# Patient Record
Sex: FEMALE | Race: OTHER | HISPANIC OR LATINO | Employment: FULL TIME | URBAN - METROPOLITAN AREA
[De-identification: names, ages, dates, MRNs, and addresses within clinical notes are randomized per-mention and may not be internally consistent; named-entity substitution may affect disease eponyms.]

---

## 2018-03-07 ENCOUNTER — HOSPITAL ENCOUNTER (OUTPATIENT)
Dept: LAB | Facility: MEDICAL CENTER | Age: 28
End: 2018-03-07
Attending: SPECIALIST
Payer: COMMERCIAL

## 2018-03-07 LAB — B-HCG SERPL-ACNC: <0.6 MIU/ML (ref 0–5)

## 2018-03-07 PROCEDURE — 84702 CHORIONIC GONADOTROPIN TEST: CPT

## 2018-03-07 PROCEDURE — 36415 COLL VENOUS BLD VENIPUNCTURE: CPT

## 2018-11-16 ENCOUNTER — HOSPITAL ENCOUNTER (OUTPATIENT)
Dept: LAB | Facility: MEDICAL CENTER | Age: 28
End: 2018-11-16
Attending: OBSTETRICS & GYNECOLOGY
Payer: COMMERCIAL

## 2018-11-16 LAB
ALBUMIN SERPL BCP-MCNC: 4.9 G/DL (ref 3.2–4.9)
ALBUMIN/GLOB SERPL: 2 G/DL
ALP SERPL-CCNC: 69 U/L (ref 30–99)
ALT SERPL-CCNC: 57 U/L (ref 2–50)
ANION GAP SERPL CALC-SCNC: 10 MMOL/L (ref 0–11.9)
AST SERPL-CCNC: 27 U/L (ref 12–45)
BILIRUB SERPL-MCNC: 0.7 MG/DL (ref 0.1–1.5)
BUN SERPL-MCNC: 19 MG/DL (ref 8–22)
CALCIUM SERPL-MCNC: 9.9 MG/DL (ref 8.5–10.5)
CHLORIDE SERPL-SCNC: 104 MMOL/L (ref 96–112)
CO2 SERPL-SCNC: 25 MMOL/L (ref 20–33)
CREAT SERPL-MCNC: 0.64 MG/DL (ref 0.5–1.4)
GLOBULIN SER CALC-MCNC: 2.4 G/DL (ref 1.9–3.5)
GLUCOSE SERPL-MCNC: 88 MG/DL (ref 65–99)
POTASSIUM SERPL-SCNC: 3.8 MMOL/L (ref 3.6–5.5)
PROT SERPL-MCNC: 7.3 G/DL (ref 6–8.2)
SODIUM SERPL-SCNC: 139 MMOL/L (ref 135–145)
T4 FREE SERPL-MCNC: 0.96 NG/DL (ref 0.53–1.43)
TSH SERPL DL<=0.005 MIU/L-ACNC: 1.85 UIU/ML (ref 0.38–5.33)

## 2018-11-16 PROCEDURE — 84439 ASSAY OF FREE THYROXINE: CPT

## 2018-11-16 PROCEDURE — 36415 COLL VENOUS BLD VENIPUNCTURE: CPT

## 2018-11-16 PROCEDURE — 84443 ASSAY THYROID STIM HORMONE: CPT

## 2018-11-16 PROCEDURE — 80053 COMPREHEN METABOLIC PANEL: CPT

## 2019-05-20 ENCOUNTER — APPOINTMENT (OUTPATIENT)
Dept: RADIOLOGY | Facility: MEDICAL CENTER | Age: 29
End: 2019-05-20
Attending: OBSTETRICS & GYNECOLOGY
Payer: COMMERCIAL

## 2019-06-10 ENCOUNTER — HOSPITAL ENCOUNTER (OUTPATIENT)
Dept: RADIOLOGY | Facility: MEDICAL CENTER | Age: 29
End: 2019-06-10
Attending: OBSTETRICS & GYNECOLOGY
Payer: COMMERCIAL

## 2019-06-10 DIAGNOSIS — Z34.82 PRENATAL CARE, SUBSEQUENT PREGNANCY, SECOND TRIMESTER: ICD-10-CM

## 2019-06-10 PROCEDURE — 76805 OB US >/= 14 WKS SNGL FETUS: CPT

## 2019-07-11 ENCOUNTER — HOSPITAL ENCOUNTER (OUTPATIENT)
Dept: LAB | Facility: MEDICAL CENTER | Age: 29
End: 2019-07-11
Attending: OBSTETRICS & GYNECOLOGY
Payer: COMMERCIAL

## 2019-07-11 LAB
ABO GROUP BLD: NORMAL
ERYTHROCYTE [DISTWIDTH] IN BLOOD BY AUTOMATED COUNT: 40.5 FL (ref 35.9–50)
GLUCOSE 1H P 50 G GLC PO SERPL-MCNC: 142 MG/DL (ref 70–139)
HCT VFR BLD AUTO: 37.7 % (ref 37–47)
HGB BLD-MCNC: 13 G/DL (ref 12–16)
MCH RBC QN AUTO: 29.8 PG (ref 27–33)
MCHC RBC AUTO-ENTMCNC: 34.5 G/DL (ref 33.6–35)
MCV RBC AUTO: 86.5 FL (ref 81.4–97.8)
PLATELET # BLD AUTO: 284 K/UL (ref 164–446)
PMV BLD AUTO: 9.5 FL (ref 9–12.9)
RBC # BLD AUTO: 4.36 M/UL (ref 4.2–5.4)
RH BLD: NORMAL
TREPONEMA PALLIDUM IGG+IGM AB [PRESENCE] IN SERUM OR PLASMA BY IMMUNOASSAY: NON REACTIVE
WBC # BLD AUTO: 6.4 K/UL (ref 4.8–10.8)

## 2019-07-11 PROCEDURE — 36415 COLL VENOUS BLD VENIPUNCTURE: CPT

## 2019-07-11 PROCEDURE — 86780 TREPONEMA PALLIDUM: CPT

## 2019-07-11 PROCEDURE — 86901 BLOOD TYPING SEROLOGIC RH(D): CPT

## 2019-07-11 PROCEDURE — 86900 BLOOD TYPING SEROLOGIC ABO: CPT

## 2019-07-11 PROCEDURE — 85027 COMPLETE CBC AUTOMATED: CPT

## 2019-07-11 PROCEDURE — 82950 GLUCOSE TEST: CPT

## 2021-05-27 ENCOUNTER — TELEPHONE (OUTPATIENT)
Dept: SCHEDULING | Facility: IMAGING CENTER | Age: 31
End: 2021-05-27

## 2021-06-08 ENCOUNTER — HOSPITAL ENCOUNTER (OUTPATIENT)
Dept: LAB | Facility: MEDICAL CENTER | Age: 31
End: 2021-06-08
Attending: NURSE PRACTITIONER
Payer: COMMERCIAL

## 2021-06-08 ENCOUNTER — OFFICE VISIT (OUTPATIENT)
Dept: MEDICAL GROUP | Facility: PHYSICIAN GROUP | Age: 31
End: 2021-06-08
Payer: COMMERCIAL

## 2021-06-08 VITALS
RESPIRATION RATE: 14 BRPM | WEIGHT: 142 LBS | DIASTOLIC BLOOD PRESSURE: 78 MMHG | HEART RATE: 83 BPM | SYSTOLIC BLOOD PRESSURE: 108 MMHG | TEMPERATURE: 99.5 F | OXYGEN SATURATION: 100 % | HEIGHT: 62 IN | BODY MASS INDEX: 26.13 KG/M2

## 2021-06-08 DIAGNOSIS — Z01.818 PREOP GENERAL PHYSICAL EXAM: ICD-10-CM

## 2021-06-08 DIAGNOSIS — Z01.818 PREOP GENERAL PHYSICAL EXAM: Primary | ICD-10-CM

## 2021-06-08 LAB
ALBUMIN SERPL BCP-MCNC: 4.9 G/DL (ref 3.2–4.9)
ALBUMIN/GLOB SERPL: 1.8 G/DL
ALP SERPL-CCNC: 66 U/L (ref 30–99)
ALT SERPL-CCNC: 42 U/L (ref 2–50)
ANION GAP SERPL CALC-SCNC: 7 MMOL/L (ref 7–16)
APPEARANCE UR: NORMAL
AST SERPL-CCNC: 29 U/L (ref 12–45)
BILIRUB SERPL-MCNC: 0.5 MG/DL (ref 0.1–1.5)
BILIRUB UR STRIP-MCNC: NORMAL MG/DL
BUN SERPL-MCNC: 18 MG/DL (ref 8–22)
CALCIUM SERPL-MCNC: 9.7 MG/DL (ref 8.5–10.5)
CHLORIDE SERPL-SCNC: 108 MMOL/L (ref 96–112)
CO2 SERPL-SCNC: 23 MMOL/L (ref 20–33)
COLOR UR AUTO: YELLOW
CREAT SERPL-MCNC: 0.6 MG/DL (ref 0.5–1.4)
ERYTHROCYTE [DISTWIDTH] IN BLOOD BY AUTOMATED COUNT: 41.9 FL (ref 35.9–50)
EST. AVERAGE GLUCOSE BLD GHB EST-MCNC: 94 MG/DL
FASTING STATUS PATIENT QL REPORTED: NORMAL
GLOBULIN SER CALC-MCNC: 2.7 G/DL (ref 1.9–3.5)
GLUCOSE SERPL-MCNC: 96 MG/DL (ref 65–99)
GLUCOSE UR STRIP.AUTO-MCNC: NORMAL MG/DL
HBA1C MFR BLD: 4.9 % (ref 4–5.6)
HCG SERPL QL: NEGATIVE
HCT VFR BLD AUTO: 46.1 % (ref 37–47)
HGB BLD-MCNC: 15.6 G/DL (ref 12–16)
KETONES UR STRIP.AUTO-MCNC: NORMAL MG/DL
LEUKOCYTE ESTERASE UR QL STRIP.AUTO: NORMAL
MCH RBC QN AUTO: 29.8 PG (ref 27–33)
MCHC RBC AUTO-ENTMCNC: 33.8 G/DL (ref 33.6–35)
MCV RBC AUTO: 88.1 FL (ref 81.4–97.8)
NITRITE UR QL STRIP.AUTO: NORMAL
PH UR STRIP.AUTO: 5 [PH] (ref 5–8)
PLATELET # BLD AUTO: 271 K/UL (ref 164–446)
PMV BLD AUTO: 10.6 FL (ref 9–12.9)
POTASSIUM SERPL-SCNC: 4.8 MMOL/L (ref 3.6–5.5)
PROT SERPL-MCNC: 7.6 G/DL (ref 6–8.2)
PROT UR QL STRIP: NORMAL MG/DL
RBC # BLD AUTO: 5.23 M/UL (ref 4.2–5.4)
RBC UR QL AUTO: NORMAL
SODIUM SERPL-SCNC: 138 MMOL/L (ref 135–145)
SP GR UR STRIP.AUTO: 5
UROBILINOGEN UR STRIP-MCNC: NORMAL MG/DL
WBC # BLD AUTO: 6.3 K/UL (ref 4.8–10.8)

## 2021-06-08 PROCEDURE — 85027 COMPLETE CBC AUTOMATED: CPT

## 2021-06-08 PROCEDURE — 83036 HEMOGLOBIN GLYCOSYLATED A1C: CPT

## 2021-06-08 PROCEDURE — 81002 URINALYSIS NONAUTO W/O SCOPE: CPT | Performed by: NURSE PRACTITIONER

## 2021-06-08 PROCEDURE — 36415 COLL VENOUS BLD VENIPUNCTURE: CPT

## 2021-06-08 PROCEDURE — 84703 CHORIONIC GONADOTROPIN ASSAY: CPT

## 2021-06-08 PROCEDURE — 80053 COMPREHEN METABOLIC PANEL: CPT

## 2021-06-08 PROCEDURE — 99204 OFFICE O/P NEW MOD 45 MIN: CPT | Performed by: NURSE PRACTITIONER

## 2021-06-08 ASSESSMENT — PATIENT HEALTH QUESTIONNAIRE - PHQ9: CLINICAL INTERPRETATION OF PHQ2 SCORE: 0

## 2021-06-08 NOTE — PROGRESS NOTES
"Subjective:     CC: Preoperative clearance.    HPI:   Zoya presents today to establish care.  Her past medical, family, social and surgical history were reviewed today.  She is pending surgery with Dr. Nuñez and needs preoperative clearance today.      History reviewed. No pertinent past medical history.    Social History     Tobacco Use   • Smoking status: Never Smoker   • Smokeless tobacco: Never Used   Vaping Use   • Vaping Use: Never used   Substance Use Topics   • Alcohol use: Yes     Comment: occ   • Drug use: Never       No current [facility]-ordered outpatient medications on file.     No current [facility]-ordered facility-administered medications on file.       Allergies:  Patient has no allergy information on record.    Health Maintenance: Deferred.    ROS:  Gen: no fevers/chills, no changes in weight  Eyes: no changes in vision  ENT: no sore throat, no hearing loss, no bloody nose  Pulm: no sob, no cough  CV: no chest pain, no palpitations  GI: no nausea/vomiting, no diarrhea  : no dysuria  MSk: no myalgias  Skin: no rash  Neuro: no headaches, no numbness/tingling  Heme/Lymph: no easy bruising      Objective:       Exam:  /78   Pulse 83   Temp 37.5 °C (99.5 °F)   Resp 14   Ht 1.575 m (5' 2\")   Wt 64.4 kg (142 lb)   LMP 05/22/2021 (Approximate)   SpO2 100%   BMI 25.97 kg/m²  Body mass index is 25.97 kg/m².    Gen: Alert and oriented, No apparent distress.  Neck: Neck is supple without lymphadenopathy.  No carotid bruits.  Lungs: Normal effort, CTA bilaterally, no wheezes, rhonchi, or rales  CV: Regular rate and rhythm. No murmurs, rubs, or gallops.  Ext: No clubbing, cyanosis, edema.    Labs: None.     EKG Interpretation   Ordered and interpreted by SHAUN Manzanares  Rhythm: Normal sinus   Rate: 62 and normal   VT:  .12  QRS: .08  QT:  .40  ST Segments:No acute change   T Waves: No acute change   Q Waves: none   Clinical Impression: no acute changes and normal EKG        Assessment & Plan:     31 " y.o. female with the following -       1. Preop general physical exam  Patient is planning on having plastic surgery with Dr. Luis Enrique Nuñez.  She does need preop clearance for this procedure and she is here today for that.  Patient brings list of labs including HCG, CBC, CMP, hemoglobin A1c that her surgeon is requesting, as well as an EKG which was performed in the office today.   When the results of her labs are available, we will fax to Dr. Nuñez's office.    Patient has no history of peripheral artery disease, TIA or stroke, heart failure, hypertension, coronary artery disease, or valvular disease.  Patient does not have a history of atrial fibrillation or conduction abnormalities. Patient does not have a history of diabetes or kidney disease.      Patient denies chest pain, shortness of breath, syncope or palpitations.  Functional capacity status exam reveals patient achieves a total of greater than 10 METS, indicating a good prognostic indicator.  The [employer]geons surgical risk calculator indicates patient has a low risk of serious complications.  Patient’s EKG today indicates normal sinus rhythm.  At this time, patient is cleared for surgery.    - EKG - Clinic Performed  - Comp Metabolic Panel; Future  - CBC WITHOUT DIFFERENTIAL; Future  - HEMOGLOBIN A1C; Future    ADDENDUM:  All labs requested by surgeon are completely normal.  This note and the labs will be faxed to his office today, June 9, 2021.    Return in about 1 year (around 6/8/2022).    I have placed the below orders and discussed them with an approved delegating provider.  The MA is performing the below orders under the direction of Chayito Elena MD.    Please note that this dictation was created using voice recognition software. I have made every reasonable attempt to correct obvious errors, but I expect that there are errors of grammar and possibly content that I did not discover before finalizing the note.

## 2021-06-08 NOTE — ASSESSMENT & PLAN NOTE
Patient is planning on having plastic surgery with Dr. Luis Enrique Nuñez.  She does need preop clearance for this procedure and she is here today for that.  Patient brings list of labs including HCG, CBC, CMP, hemoglobin A1c that her surgeon is requesting, as well as an EKG which was performed in the office today.   When the results of her labs are available, we will fax to Dr. Nuñez's office.

## 2021-07-22 ENCOUNTER — TELEPHONE (OUTPATIENT)
Dept: MEDICAL GROUP | Facility: PHYSICIAN GROUP | Age: 31
End: 2021-07-22

## 2021-07-22 DIAGNOSIS — Z01.818 PREOPERATIVE CLEARANCE: ICD-10-CM

## 2021-07-23 NOTE — TELEPHONE ENCOUNTER
VOICEMAIL  1. Caller Name: Zoya                        Call Back Number: [phone] (home) [phone] (work)      2. Message: Pt called stating that she missed her flight for her surgery and due to covid regulations will need to have new lab orders completed for the surgeon before her new future surgery date.    3. Patient approves office to leave a detailed voicemail/MyChart message: N\A

## 2021-07-30 ENCOUNTER — HOSPITAL ENCOUNTER (OUTPATIENT)
Dept: LAB | Facility: MEDICAL CENTER | Age: 31
End: 2021-07-30
Attending: NURSE PRACTITIONER
Payer: COMMERCIAL

## 2021-07-30 DIAGNOSIS — Z01.818 PREOPERATIVE CLEARANCE: ICD-10-CM

## 2021-07-30 LAB
ALBUMIN SERPL BCP-MCNC: 4.6 G/DL (ref 3.2–4.9)
ALBUMIN/GLOB SERPL: 1.8 G/DL
ALP SERPL-CCNC: 62 U/L (ref 30–99)
ALT SERPL-CCNC: 27 U/L (ref 2–50)
ANION GAP SERPL CALC-SCNC: 10 MMOL/L (ref 7–16)
APPEARANCE UR: ABNORMAL
AST SERPL-CCNC: 19 U/L (ref 12–45)
BACTERIA #/AREA URNS HPF: ABNORMAL /HPF
BILIRUB SERPL-MCNC: 0.7 MG/DL (ref 0.1–1.5)
BILIRUB UR QL STRIP.AUTO: NEGATIVE
BUN SERPL-MCNC: 14 MG/DL (ref 8–22)
CALCIUM SERPL-MCNC: 9.3 MG/DL (ref 8.5–10.5)
CHLORIDE SERPL-SCNC: 103 MMOL/L (ref 96–112)
CO2 SERPL-SCNC: 24 MMOL/L (ref 20–33)
COLOR UR: YELLOW
CREAT SERPL-MCNC: 0.57 MG/DL (ref 0.5–1.4)
EPI CELLS #/AREA URNS HPF: ABNORMAL /HPF
ERYTHROCYTE [DISTWIDTH] IN BLOOD BY AUTOMATED COUNT: 40.4 FL (ref 35.9–50)
EST. AVERAGE GLUCOSE BLD GHB EST-MCNC: 97 MG/DL
FASTING STATUS PATIENT QL REPORTED: NORMAL
GLOBULIN SER CALC-MCNC: 2.5 G/DL (ref 1.9–3.5)
GLUCOSE SERPL-MCNC: 93 MG/DL (ref 65–99)
GLUCOSE UR STRIP.AUTO-MCNC: NEGATIVE MG/DL
HBA1C MFR BLD: 5 % (ref 4–5.6)
HCG SERPL QL: NEGATIVE
HCT VFR BLD AUTO: 44.4 % (ref 37–47)
HGB BLD-MCNC: 15.3 G/DL (ref 12–16)
HYALINE CASTS #/AREA URNS LPF: ABNORMAL /LPF
KETONES UR STRIP.AUTO-MCNC: NEGATIVE MG/DL
LEUKOCYTE ESTERASE UR QL STRIP.AUTO: ABNORMAL
MCH RBC QN AUTO: 30.1 PG (ref 27–33)
MCHC RBC AUTO-ENTMCNC: 34.5 G/DL (ref 33.6–35)
MCV RBC AUTO: 87.4 FL (ref 81.4–97.8)
MICRO URNS: ABNORMAL
NITRITE UR QL STRIP.AUTO: POSITIVE
PH UR STRIP.AUTO: 6 [PH] (ref 5–8)
PLATELET # BLD AUTO: 298 K/UL (ref 164–446)
PMV BLD AUTO: 10.7 FL (ref 9–12.9)
POTASSIUM SERPL-SCNC: 4.6 MMOL/L (ref 3.6–5.5)
PROT SERPL-MCNC: 7.1 G/DL (ref 6–8.2)
PROT UR QL STRIP: NEGATIVE MG/DL
RBC # BLD AUTO: 5.08 M/UL (ref 4.2–5.4)
RBC # URNS HPF: ABNORMAL /HPF
RBC UR QL AUTO: ABNORMAL
SODIUM SERPL-SCNC: 137 MMOL/L (ref 135–145)
SP GR UR STRIP.AUTO: 1.02
UROBILINOGEN UR STRIP.AUTO-MCNC: 0.2 MG/DL
WBC # BLD AUTO: 6.1 K/UL (ref 4.8–10.8)
WBC #/AREA URNS HPF: ABNORMAL /HPF

## 2021-07-30 PROCEDURE — 84703 CHORIONIC GONADOTROPIN ASSAY: CPT

## 2021-07-30 PROCEDURE — 36415 COLL VENOUS BLD VENIPUNCTURE: CPT

## 2021-07-30 PROCEDURE — 81001 URINALYSIS AUTO W/SCOPE: CPT

## 2021-07-30 PROCEDURE — 83036 HEMOGLOBIN GLYCOSYLATED A1C: CPT

## 2021-07-30 PROCEDURE — 85027 COMPLETE CBC AUTOMATED: CPT

## 2021-07-30 PROCEDURE — 80053 COMPREHEN METABOLIC PANEL: CPT

## 2021-08-02 DIAGNOSIS — N30.00 ACUTE CYSTITIS WITHOUT HEMATURIA: ICD-10-CM

## 2021-08-02 RX ORDER — NITROFURANTOIN 25; 75 MG/1; MG/1
100 CAPSULE ORAL 2 TIMES DAILY
Qty: 10 CAPSULE | Refills: 0 | Status: SHIPPED | OUTPATIENT
Start: 2021-08-02 | End: 2024-01-19

## 2022-02-23 ENCOUNTER — TELEPHONE (OUTPATIENT)
Dept: MEDICAL GROUP | Facility: PHYSICIAN GROUP | Age: 32
End: 2022-02-23
Payer: COMMERCIAL

## 2022-02-24 NOTE — TELEPHONE ENCOUNTER
VOICEMAIL  1. Caller Name: Zoya                        Call Back Number: [phone] (home) [phone] (work)      2. Message: Pt called and asked for a referral for wound care, pt states she believes she has necrosis on an incision she has on her abdomen.  Pt has not been seen since June 2021.  Calling pt to make an appt to see PCP.    3. Patient approves office to leave a detailed voicemail/MyChart message: N\A

## 2022-02-25 NOTE — TELEPHONE ENCOUNTER
2nd Attempt:     LIDIA    Contacted pt and pt stated that she went to [facility] and they told her that the substance is dried blood and is not Necrosis.     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

## 2022-03-28 ENCOUNTER — OFFICE VISIT (OUTPATIENT)
Dept: WOUND CARE | Facility: MEDICAL CENTER | Age: 32
End: 2022-03-28
Attending: FAMILY MEDICINE
Payer: COMMERCIAL

## 2022-03-28 VITALS
HEART RATE: 76 BPM | TEMPERATURE: 99.1 F | OXYGEN SATURATION: 96 % | RESPIRATION RATE: 16 BRPM | DIASTOLIC BLOOD PRESSURE: 82 MMHG | SYSTOLIC BLOOD PRESSURE: 139 MMHG

## 2022-03-28 DIAGNOSIS — S31.109A OPEN WOUND OF ABDOMEN, INITIAL ENCOUNTER: ICD-10-CM

## 2022-03-28 PROCEDURE — 99214 OFFICE O/P EST MOD 30 MIN: CPT

## 2022-03-28 PROCEDURE — 11042 DBRDMT SUBQ TIS 1ST 20SQCM/<: CPT

## 2022-03-28 PROCEDURE — 11042 DBRDMT SUBQ TIS 1ST 20SQCM/<: CPT | Performed by: STUDENT IN AN ORGANIZED HEALTH CARE EDUCATION/TRAINING PROGRAM

## 2022-03-28 ASSESSMENT — ENCOUNTER SYMPTOMS
DIARRHEA: 0
ORTHOPNEA: 0
CHILLS: 0
VOMITING: 0
NAUSEA: 0
FEVER: 0
CONSTIPATION: 0

## 2022-03-28 ASSESSMENT — PAIN SCALES - GENERAL: PAINLEVEL: NO PAIN

## 2022-03-28 NOTE — PATIENT INSTRUCTIONS
-Keep your wound dressing clean, dry, and intact.    -Change your dressing if it becomes soiled, soaked, or falls off.    --Should you experience any significant changes in your wound(s), such as infection (redness, swelling, localized heat, increased pain, fever > 101 F, chills) or have any questions regarding your home care instructions, please contact the wound center at [phone]. If after hours, contact your primary care physician or go to the hospital emergency room.

## 2022-03-28 NOTE — PROGRESS NOTES
Provider Encounter- Full Thickness wound    HISTORY OF PRESENT ILLNESS  Wound History:    START OF CARE IN CLINIC: 3/28/2022    REFERRING PROVIDER: Le Case      WOUND- Full Thickness Wound   LOCATION: Lower Anterior Abdomen, Left lower Abdomen / Flank   HISTORY:  31F underwent abdominoplasty, liposuction, and Brazilian butt lift on 1/25/2022 in [place]. Patient surgical recovery was complicated by developing wound dehiscence and eschar to abdominal wound. Patient was following with [facility] but transferred care to [facility] as she moved to [place]. Patient was previously treated with snap VAC and was treated with medihoney and telfa prior to today's appointment.     Pertinent Medical History: s/p abdominoplasty, liposuction, brazilian butt lift. Remote history of caesarian section    TOBACCO USE:  Denies every using    Patient's problem list, allergies, and current medications reviewed and updated in Epic    Interval History:  3/28/2022: Initial Clinic visit with Ayush Hernandez MD. Patient was previously being seen at [facility] for open wound lower anterior abdomen following abdominoplasty. Patient reports being in her normal state of health, denies any symptoms of infection. She reports wound drains, but unsure of color as she has been using medihoney on wound. Patient denies any previous medical history and takes no medications regularly.      REVIEW OF SYSTEMS:   Review of Systems   Constitutional: Negative for chills, fever and malaise/fatigue.   Cardiovascular: Negative for orthopnea and leg swelling.   Gastrointestinal: Negative for constipation, diarrhea, nausea and vomiting.   Skin:        Open wound lower abdomen       PHYSICAL EXAMINATION:   /82   Pulse 76   Temp 37.3 °C (99.1 °F)   Resp 16   LMP 02/22/2022   SpO2 96%   Breastfeeding Unknown     Physical Exam  Constitutional:       General: She is not in acute distress.  Cardiovascular:      Rate and Rhythm: Normal  rate.   Pulmonary:      Effort: Pulmonary effort is normal. No respiratory distress.   Abdominal:      General: There is no distension.      Palpations: Abdomen is soft.      Tenderness: There is no abdominal tenderness.   Musculoskeletal:      Right lower leg: No edema.      Left lower leg: No edema.   Skin:     Comments: Full thickness wound lower anterior abdomen to fat layer  Indurated scar tissue surrounding wound  See flowsheet for details   Neurological:      General: No focal deficit present.      Mental Status: She is alert and oriented to person, place, and time. Mental status is at baseline.   Psychiatric:         Mood and Affect: Mood normal.         Behavior: Behavior normal.         WOUND ASSESSMENT  Wound 03/28/22 Pelvis Anterior Low Abdomen midline (Active)   Wound Image    03/28/22 1600   Site Assessment Yellow;Pink;Pale 03/28/22 1600   Periwound Assessment Pink;Induration 03/28/22 1600   Margins Epibole (rolled edges) 03/28/22 1600   Drainage Amount Small 03/28/22 1600   Drainage Description Serous;Yellow 03/28/22 1600   Treatments Cleansed;Topical Lidocaine;Provider debridement 03/28/22 1600   Wound Cleansing Puracyn [place] 03/28/22 1600   Periwound Protectant Skin Protectant Wipes to Periwound;Barrier Paste 03/28/22 1600   Dressing Cleansing/Solutions Not Applicable 03/28/22 1600   Dressing Options Hydrofiber Silver;Silicone Adhesive Foam 03/28/22 1600   Dressing Changed New 03/28/22 1600   Dressing Status Clean;Dry;Intact 03/28/22 1600   Dressing Change/Treatment Frequency Every 72 hrs, and As Needed 03/28/22 1600   Non-staged Wound Description Full thickness 03/28/22 1600   Wound Length (cm) 2 cm 03/28/22 1600   Wound Width (cm) 2.4 cm 03/28/22 1600   Wound Depth (cm) 1 cm 03/28/22 1600   Wound Surface Area (cm^2) 4.8 cm^2 03/28/22 1600   Wound Volume (cm^3) 4.8 cm^3 03/28/22 1600   Post-Procedure Length (cm) 2.2 cm 03/28/22 1600   Post-Procedure Width (cm) 2.4 cm 03/28/22 1600   Post-Procedure  Depth (cm) 1 cm 03/28/22 1600   Post-Procedure Surface Area (cm^2) 5.28 cm^2 03/28/22 1600   Post-Procedure Volume (cm^3) 5.28 cm^3 03/28/22 1600   Wound Bed Slough (%) 100 % 03/28/22 1600   Tunneling (cm) 0 cm 03/28/22 1600   Undermining (cm) 0.3 cm 03/28/22 1600   Undermining of Wound, 1st Location From 7 o'clock;To 1 o'clock 03/28/22 1600   Wound Odor None 03/28/22 1600   Exposed Structures Adipose 03/28/22 1600   Number of days: 0       PROCEDURE:   -2% viscous lidocaine applied topically to wound bed for approximately 5 minutes prior to debridement  -Forceps, scissors, curette used to debride wound bed.  Excisional debridement was performed to remove devitalized tissue until healthy, bleeding tissue was visualized.   Entire surface of wound, 5.28cm2 debrided.  Tissue debrided into the subcutaneous layer.    -Bleeding controlled with manual pressure.    -Wound care completed by wound RN, refer to flowsheet  -Patient tolerated the procedure well, without c/o pain or discomfort.       Pertinent Labs and Diagnostics:    Labs:     A1c:   Lab Results   Component Value Date/Time    HBA1C 5.0 07/30/2021 09:33 AM          IMAGING: NA    VASCULAR STUDIES: NA    LAST  WOUND CULTURE:  DATE : No results found for: CULTRSULT      ASSESSMENT AND PLAN:     1. Open wound of abdomen, initial encounter  Comments: Open necrotic wound following abdominoplasty in [place] 1/2022 previously following [facility] clinic. Now establishing at [facility].    3/28/2022: Wound covered with slough, following debridement down to adipose layer. Noted to have circumferential undermining and epibole  - Excisional debridement was performed in clinic, medically necessary to promote wound healing.  - Discussed nature of wound and plans for wound healing  - Noted to have induration, likely scar tissue in periwound. No evidence of infection  - Due to depth of wound, will apply for prior authorization for snap VAC. Hope to place next clinic  appointment.  - Return to clinic weekly for assessment and debridement as needed   Wound Care: Hydrofiber silver, Adhesive foam    PATIENT EDUCATION  - Importance of adequate nutrition for wound healing  -Advised to go to ER for any increased redness, swelling, drainage, or odor, or if patient develops fever, chills, nausea or vomiting.     My total time spent caring for the patient on the day of the encounter was 30 minutes.   This does not include time spent on separately billable procedures/tests.       Please note that this note may have been created using voice recognition software. I have worked with technical experts from [facility] to optimize the interface.  I have made every reasonable attempt to correct obvious errors, but there may be errors of grammar and possibly content that I did not discover before finalizing the note.    N

## 2022-04-05 ENCOUNTER — OFFICE VISIT (OUTPATIENT)
Dept: WOUND CARE | Facility: MEDICAL CENTER | Age: 32
End: 2022-04-05
Attending: FAMILY MEDICINE
Payer: COMMERCIAL

## 2022-04-05 VITALS
HEART RATE: 72 BPM | OXYGEN SATURATION: 100 % | RESPIRATION RATE: 17 BRPM | SYSTOLIC BLOOD PRESSURE: 135 MMHG | TEMPERATURE: 98 F | DIASTOLIC BLOOD PRESSURE: 88 MMHG

## 2022-04-05 DIAGNOSIS — S31.109D OPEN WOUND OF ABDOMEN, SUBSEQUENT ENCOUNTER: ICD-10-CM

## 2022-04-05 PROCEDURE — 99213 OFFICE O/P EST LOW 20 MIN: CPT

## 2022-04-05 PROCEDURE — 11042 DBRDMT SUBQ TIS 1ST 20SQCM/<: CPT

## 2022-04-05 PROCEDURE — 11042 DBRDMT SUBQ TIS 1ST 20SQCM/<: CPT | Performed by: NURSE PRACTITIONER

## 2022-04-05 ASSESSMENT — ENCOUNTER SYMPTOMS
CHILLS: 0
DIARRHEA: 0
FEVER: 0
CONSTIPATION: 0
ORTHOPNEA: 0
NAUSEA: 0
VOMITING: 0

## 2022-04-05 NOTE — PROGRESS NOTES
Provider Encounter- Full Thickness wound    HISTORY OF PRESENT ILLNESS  Wound History:    START OF CARE IN CLINIC: 3/28/2022    REFERRING PROVIDER: Le Case      WOUND- Full Thickness Wound   LOCATION: Lower Anterior Abdomen, Left lower Abdomen / Flank   HISTORY:  31F underwent abdominoplasty, liposuction, and Brazilian butt lift on 1/25/2022 in [place]. Patient surgical recovery was complicated by developing wound dehiscence and eschar to abdominal wound. Patient was following with [facility] but transferred care to [facility] as she moved to [place]. Patient was previously treated with snap VAC and was treated with medihoney and telfa prior to today's appointment.     Pertinent Medical History: s/p abdominoplasty, liposuction, brazilian butt lift. Remote history of caesarian section    TOBACCO USE:  Denies every using    Patient's problem list, allergies, and current medications reviewed and updated in Epic    Interval History:  3/28/2022: Initial Clinic visit with Ayush Hernandez MD. Patient was previously being seen at [facility] for open wound lower anterior abdomen following abdominoplasty. Patient reports being in her normal state of health, denies any symptoms of infection. She reports wound drains, but unsure of color as she has been using medihoney on wound. Patient denies any previous medical history and takes no medications regularly.    4/5/2022 : Clinic visit with SHAUN Richey, FNP-BC, CWOCN, CFCN.  Zoya states she is feeling well overall, denies fevers, chills, nausea, vomiting, cough or shortness of breath.  She has had to change her dressing 3 times over the past week, feels this is because the zinc paste around her wound is keeping the dressing from adhering.  SNAP is still pending.      REVIEW OF SYSTEMS:   Review of Systems   Constitutional: Negative for chills, fever and malaise/fatigue.   Cardiovascular: Negative for orthopnea and leg swelling.   Gastrointestinal:  Negative for constipation, diarrhea, nausea and vomiting.   Skin:        Open wound lower abdomen       PHYSICAL EXAMINATION:   /88 (BP Location: Left arm, Patient Position: Sitting)   Pulse 72   Temp 36.7 °C (98 °F) (Temporal)   Resp 17   SpO2 100%     Physical Exam  Constitutional:       General: She is not in acute distress.  Cardiovascular:      Rate and Rhythm: Normal rate.   Pulmonary:      Effort: Pulmonary effort is normal. No respiratory distress.   Abdominal:      General: There is no distension.      Palpations: Abdomen is soft.      Tenderness: There is no abdominal tenderness.   Musculoskeletal:      Right lower leg: No edema.      Left lower leg: No edema.   Skin:     Comments: Full thickness wound lower anterior abdomen to fat layer  Indurated scar tissue surrounding wound  See flowsheet for details   Neurological:      General: No focal deficit present.      Mental Status: She is alert and oriented to person, place, and time. Mental status is at baseline.   Psychiatric:         Mood and Affect: Mood normal.         Behavior: Behavior normal.         WOUND ASSESSMENT  Wound 03/28/22 Pelvis Anterior Low Abdomen midline (Active)   Wound Image    04/05/22 0800   Site Assessment Yellow;Pink;Pale 04/05/22 0800   Periwound Assessment Pink;Induration 04/05/22 0800   Margins Epibole (rolled edges) 04/05/22 0800   Drainage Amount Moderate 04/05/22 0800   Drainage Description Serosanguineous 04/05/22 0800   Treatments Cleansed;Topical Lidocaine;Provider debridement;Site care 04/05/22 0800   Wound Cleansing Puracyn [place] 04/05/22 0800   Periwound Protectant Skin Protectant Wipes to Periwound;Barrier Paste 04/05/22 0800   Dressing Cleansing/Solutions Not Applicable 04/05/22 0800   Dressing Options Hydrofiber Silver Strip;Hydrofiber Silver;Silicone Adhesive Foam 04/05/22 0800   Dressing Changed Changed 04/05/22 0800   Dressing Status Clean;Dry;Intact 04/05/22 0800   Dressing Change/Treatment Frequency  Every 72 hrs, and As Needed 03/28/22 1600   Non-staged Wound Description Full thickness 04/05/22 0800   Wound Length (cm) 2 cm 04/05/22 0800   Wound Width (cm) 1.9 cm 04/05/22 0800   Wound Depth (cm) 1 cm 04/05/22 0800   Wound Surface Area (cm^2) 3.8 cm^2 04/05/22 0800   Wound Volume (cm^3) 3.8 cm^3 04/05/22 0800   Post-Procedure Length (cm) 2 cm 04/05/22 0800   Post-Procedure Width (cm) 1.8 cm 04/05/22 0800   Post-Procedure Depth (cm) 1.1 cm 04/05/22 0800   Post-Procedure Surface Area (cm^2) 3.6 cm^2 04/05/22 0800   Post-Procedure Volume (cm^3) 3.96 cm^3 04/05/22 0800   Wound Healing % 21 04/05/22 0800   Wound Bed Slough (%) 100 % 03/28/22 1600   Tunneling (cm) 0 cm 04/05/22 0800   Undermining (cm) 1.5 cm 04/05/22 0800   Undermining of Wound, 1st Location From 7 o'clock;To 3 o'clock 04/05/22 0800   Wound Odor None 04/05/22 0800   Exposed Structures Adipose 04/05/22 0800   Number of days: 8       PROCEDURE:   -2% viscous lidocaine applied topically to wound bed for approximately 5 minutes prior to debridement  -Forceps, scissors, curette used to debride wound bed.  Excisional debridement was performed to remove devitalized tissue until healthy, bleeding tissue was visualized.   Entire surface of wound, 3.6 cm2 debrided.  Tissue debrided into the subcutaneous layer.    -Bleeding controlled with manual pressure.    -Wound care completed by wound RN, refer to flowsheet  -Patient tolerated the procedure well, without c/o pain or discomfort.       Pertinent Labs and Diagnostics:    Labs:     A1c:   Lab Results   Component Value Date/Time    HBA1C 5.0 07/30/2021 09:33 AM          IMAGING: NA    VASCULAR STUDIES: NA    LAST  WOUND CULTURE:  DATE : No results found for: CULTRSULT      ASSESSMENT AND PLAN:     1. Open wound of abdomen, initial encounter  Comments: Open necrotic wound following abdominoplasty in [place] 1/2022 previously following [facility] clinic. Now establishing at [facility].    4/5/2022: Wound bed  covered with slough and adipose, edges closed.  Area has decreased since last assessment.  - Excisional debridement of wound bed and edges was performed in clinic, medically necessary to promote wound healing.  -No signs or symptoms of infection today  -Authorization for SNAP pending  - Return to clinic weekly for assessment and debridement as needed  -Patient to change dressing 1-2 times per week in between clinic visits     Wound Care: Hydrofiber silver, Adhesive foam    PATIENT EDUCATION  - Importance of adequate nutrition for wound healing  -Advised to go to ER for any increased redness, swelling, drainage, or odor, or if patient develops fever, chills, nausea or vomiting.     My total time spent caring for the patient on the day of the encounter was 20 minutes.   This does not include time spent on separately billable procedures/tests.       Please note that this note may have been created using voice recognition software. I have worked with technical experts from [facility] to optimize the interface.  I have made every reasonable attempt to correct obvious errors, but there may be errors of grammar and possibly content that I did not discover before finalizing the note.    N

## 2022-04-05 NOTE — PATIENT INSTRUCTIONS
-Keep your wound dressing clean, dry, and intact.    -Change your dressing if it becomes soiled, soaked, or falls off.    -Should you experience any significant changes in your wound(s), such as infection (redness, swelling, localized heat, increased pain, fever > 101 F, chills) or have any questions regarding your home care instructions, please contact the wound center at [phone]. If after hours, contact your primary care physician or go to the hospital emergency room.

## 2022-04-12 ENCOUNTER — OFFICE VISIT (OUTPATIENT)
Dept: WOUND CARE | Facility: MEDICAL CENTER | Age: 32
End: 2022-04-12
Attending: FAMILY MEDICINE
Payer: COMMERCIAL

## 2022-04-12 VITALS
OXYGEN SATURATION: 99 % | DIASTOLIC BLOOD PRESSURE: 89 MMHG | TEMPERATURE: 98.4 F | SYSTOLIC BLOOD PRESSURE: 139 MMHG | RESPIRATION RATE: 16 BRPM | HEART RATE: 65 BPM

## 2022-04-12 DIAGNOSIS — S31.109D OPEN WOUND OF ABDOMEN, SUBSEQUENT ENCOUNTER: ICD-10-CM

## 2022-04-12 PROCEDURE — 11042 DBRDMT SUBQ TIS 1ST 20SQCM/<: CPT

## 2022-04-12 PROCEDURE — 99213 OFFICE O/P EST LOW 20 MIN: CPT

## 2022-04-12 PROCEDURE — 11042 DBRDMT SUBQ TIS 1ST 20SQCM/<: CPT | Performed by: NURSE PRACTITIONER

## 2022-04-12 ASSESSMENT — ENCOUNTER SYMPTOMS
VOMITING: 0
CONSTIPATION: 0
ORTHOPNEA: 0
NAUSEA: 0
DIARRHEA: 0
CHILLS: 0
FEVER: 0

## 2022-04-12 NOTE — PROGRESS NOTES
Wound care supply order faxed to [facility] [employer] [place] and scanned into patient chart via [employer].     Wound 03/28/22 Pelvis Anterior Low Abdomen midline (Active)   Wound Image   04/12/22 1614   Site Assessment Red;Yellow 04/12/22 1614   Periwound Assessment Induration 04/12/22 1614   Margins Epibole (rolled edges) 04/12/22 1614   Drainage Amount Moderate 04/12/22 1614   Drainage Description Serosanguineous 04/12/22 1614   Treatments Cleansed;Topical Lidocaine;Provider debridement 04/12/22 1614   Wound Cleansing Puracyn [place] 04/12/22 1614   Periwound Protectant Skin Protectant Wipes to Periwound;Barrier Paste 04/12/22 1614   Dressing Cleansing/Solutions Not Applicable 04/12/22 1614   Dressing Options Hydrofiber Silver Strip;Silicone Adhesive Foam 04/12/22 1614   Dressing Changed Changed 04/05/22 0800   Dressing Status Clean;Dry;Intact 04/05/22 0800   Dressing Change/Treatment Frequency Every 72 hrs, and As Needed 03/28/22 1600   Non-staged Wound Description Full thickness 04/12/22 1614   Wound Length (cm) 2 cm 04/05/22 0800   Wound Width (cm) 1.9 cm 04/05/22 0800   Wound Depth (cm) 1 cm 04/05/22 0800   Wound Surface Area (cm^2) 3.8 cm^2 04/05/22 0800   Wound Volume (cm^3) 3.8 cm^3 04/05/22 0800   Post-Procedure Length (cm) 1.8 cm 04/12/22 1614   Post-Procedure Width (cm) 1.4 cm 04/12/22 1614   Post-Procedure Depth (cm) 1.2 cm 04/12/22 1614   Post-Procedure Surface Area (cm^2) 2.52 cm^2 04/12/22 1614   Post-Procedure Volume (cm^3) 3.024 cm^3 04/12/22 1614   Wound Healing % 21 04/05/22 0800   Wound Bed Slough (%) 100 % 03/28/22 1600   Tunneling (cm) 0 cm 04/12/22 1614   Undermining (cm) 1.7 cm 04/12/22 1614   Undermining of Wound, 1st Location From 11 o'clock;To 3 o'clock 04/12/22 1614   Wound Odor None 04/12/22 1614   Exposed Structures Adipose 04/12/22 1612

## 2022-04-13 NOTE — PROGRESS NOTES
Provider Encounter- Full Thickness wound    HISTORY OF PRESENT ILLNESS  Wound History:    START OF CARE IN CLINIC: 3/28/2022    REFERRING PROVIDER: Le Case      WOUND- Full Thickness Wound   LOCATION: Lower Anterior Abdomen, Left lower Abdomen / Flank   HISTORY:  31F underwent abdominoplasty, liposuction, and Brazilian butt lift on 1/25/2022 in [place]. Patient surgical recovery was complicated by developing wound dehiscence and eschar to abdominal wound. Patient was following with [facility] but transferred care to [facility] as she moved to [place]. Patient was previously treated with snap VAC and was treated with medihoney and telfa prior to today's appointment.     Pertinent Medical History: s/p abdominoplasty, liposuction, brazilian butt lift. Remote history of caesarian section    TOBACCO USE:  Denies every using    Patient's problem list, allergies, and current medications reviewed and updated in Epic    Interval History:  3/28/2022: Initial Clinic visit with Ayush Hernandez MD. Patient was previously being seen at [facility] for open wound lower anterior abdomen following abdominoplasty. Patient reports being in her normal state of health, denies any symptoms of infection. She reports wound drains, but unsure of color as she has been using medihoney on wound. Patient denies any previous medical history and takes no medications regularly.    4/5/2022 : Clinic visit with SHAUN Richey, FNP-BC, CWOCN, CFCN.  Zoya states she is feeling well overall, denies fevers, chills, nausea, vomiting, cough or shortness of breath.  She has had to change her dressing 3 times over the past week, feels this is because the zinc paste around her wound is keeping the dressing from adhering.  SNAP is still pending.    4/12/2022: Clinic visit with SHAUN Bailey.  Zoya states overall she is feeling well.  Patient states that she is eating protein intake and vegetables to support wound healing.   We are still awaiting approval for snap VAC.      REVIEW OF SYSTEMS:   Review of Systems   Constitutional: Negative for chills, fever and malaise/fatigue.   Cardiovascular: Negative for orthopnea and leg swelling.   Gastrointestinal: Negative for constipation, diarrhea, nausea and vomiting.   Skin:        Open wound lower abdomen       PHYSICAL EXAMINATION:   /89 (BP Location: Left arm, Patient Position: Sitting)   Pulse 65   Temp 36.9 °C (98.4 °F)   Resp 16   SpO2 99%     Physical Exam  Constitutional:       General: She is not in acute distress.  Cardiovascular:      Rate and Rhythm: Normal rate.   Pulmonary:      Effort: Pulmonary effort is normal. No respiratory distress.   Abdominal:      General: There is no distension.      Palpations: Abdomen is soft.      Tenderness: There is no abdominal tenderness.   Musculoskeletal:      Right lower leg: No edema.      Left lower leg: No edema.   Skin:     Comments: Full thickness wound lower anterior abdomen to fat layer  Indurated scar tissue surrounding wound  See flowsheet for details   Neurological:      General: No focal deficit present.      Mental Status: She is alert and oriented to person, place, and time. Mental status is at baseline.   Psychiatric:         Mood and Affect: Mood normal.         Behavior: Behavior normal.         WOUND ASSESSMENT  Wound 03/28/22 Pelvis Anterior Low Abdomen midline (Active)   Wound Image   04/12/22 1614   Site Assessment Red;Yellow 04/12/22 1614   Periwound Assessment Induration 04/12/22 1614   Margins Epibole (rolled edges) 04/12/22 1614   Drainage Amount Moderate 04/12/22 1614   Drainage Description Serosanguineous 04/12/22 1614   Treatments Cleansed;Topical Lidocaine;Provider debridement 04/12/22 1614   Wound Cleansing Puracyn Denison 04/12/22 1614   Periwound Protectant Skin Protectant Wipes to Periwound;Barrier Paste 04/12/22 1614   Dressing Cleansing/Solutions Not Applicable 04/12/22 1614   Dressing Options  Hydrofiber Silver Strip;Silicone Adhesive Foam 04/12/22 1614   Dressing Changed Changed 04/05/22 0800   Dressing Status Clean;Dry;Intact 04/05/22 0800   Dressing Change/Treatment Frequency Every 72 hrs, and As Needed 03/28/22 1600   Non-staged Wound Description Full thickness 04/12/22 1614   Wound Length (cm) 2 cm 04/05/22 0800   Wound Width (cm) 1.9 cm 04/05/22 0800   Wound Depth (cm) 1 cm 04/05/22 0800   Wound Surface Area (cm^2) 3.8 cm^2 04/05/22 0800   Wound Volume (cm^3) 3.8 cm^3 04/05/22 0800   Post-Procedure Length (cm) 1.8 cm 04/12/22 1614   Post-Procedure Width (cm) 1.4 cm 04/12/22 1614   Post-Procedure Depth (cm) 1.2 cm 04/12/22 1614   Post-Procedure Surface Area (cm^2) 2.52 cm^2 04/12/22 1614   Post-Procedure Volume (cm^3) 3.024 cm^3 04/12/22 1614   Wound Healing % 21 04/05/22 0800   Wound Bed Slough (%) 100 % 03/28/22 1600   Tunneling (cm) 0 cm 04/12/22 1614   Undermining (cm) 1.7 cm 04/12/22 1614   Undermining of Wound, 1st Location From 11 o'clock;To 3 o'clock 04/12/22 1614   Wound Odor None 04/12/22 1614   Exposed Structures Adipose 04/12/22 1614   Number of days: 15       PROCEDURE:   -2% viscous lidocaine applied topically to wound bed for approximately 5 minutes prior to debridement  -Forceps, scissors, curette used to debride wound bed.  Excisional debridement was performed to remove devitalized tissue until healthy, bleeding tissue was visualized.   Entire surface of wound, 2.52 cm2 debrided.  Tissue debrided into the subcutaneous layer.    -Bleeding controlled with manual pressure.    -Wound care completed by wound RN, refer to flowsheet  -Patient tolerated the procedure well, without c/o pain or discomfort.       Pertinent Labs and Diagnostics:    Labs:     A1c:   Lab Results   Component Value Date/Time    HBA1C 5.0 07/30/2021 09:33 AM          IMAGING: NA    VASCULAR STUDIES: NA    LAST  WOUND CULTURE:  DATE : No results found for: CULTRSULT      ASSESSMENT AND PLAN:     1. Open wound of  abdomen, subsequent encounter  Comments: Open necrotic wound following abdominoplasty in [place] 1/2022 previously following [facility] clinic. Now establishing at [facility].    4/12/2022: Wound bed covered with slough and adipose, edges closed.  Area has decreased since last assessment.  -Discussed with the patient taking on rolled edges once the depth of the wound has decreased.  - Excisional debridement of wound bed was performed in clinic, medically necessary to promote wound healing.  -No signs or symptoms of infection today  -Still awaiting authorization for snap VAC.  -Prism order for wound dressings sent in clinic today while the patient awaits snapback approval  - Return to clinic weekly for assessment and debridement as needed  -Patient to change dressing 1-2 times per week in between clinic visits     Wound Care: Hydrofiber silver strip, silicone adhesive foam    PATIENT EDUCATION  - Importance of adequate nutrition for wound healing  -Advised to go to ER for any increased redness, swelling, drainage, or odor, or if patient develops fever, chills, nausea or vomiting.     My total time spent caring for the patient on the day of the encounter was 20 minutes.   This does not include time spent on separately billable procedures/tests.       Please note that this note may have been created using voice recognition software. I have worked with technical experts from [facility] to optimize the interface.  I have made every reasonable attempt to correct obvious errors, but there may be errors of grammar and possibly content that I did not discover before finalizing the note.    N

## 2022-04-14 ENCOUNTER — TELEPHONE (OUTPATIENT)
Dept: WOUND CARE | Facility: MEDICAL CENTER | Age: 32
End: 2022-04-14
Payer: COMMERCIAL

## 2022-04-19 ENCOUNTER — OFFICE VISIT (OUTPATIENT)
Dept: WOUND CARE | Facility: MEDICAL CENTER | Age: 32
End: 2022-04-19
Payer: COMMERCIAL

## 2022-04-19 DIAGNOSIS — S31.109D OPEN WOUND OF ABDOMEN, SUBSEQUENT ENCOUNTER: ICD-10-CM

## 2022-04-19 NOTE — NON-PROVIDER
Per PAR. Pt called to cx her appointment today. She does not want to come in until the VAC is approved.

## 2022-05-03 ENCOUNTER — OFFICE VISIT (OUTPATIENT)
Dept: WOUND CARE | Facility: MEDICAL CENTER | Age: 32
End: 2022-05-03
Attending: FAMILY MEDICINE
Payer: COMMERCIAL

## 2022-05-03 ENCOUNTER — TELEPHONE (OUTPATIENT)
Dept: WOUND CARE | Facility: MEDICAL CENTER | Age: 32
End: 2022-05-03
Payer: COMMERCIAL

## 2022-05-03 DIAGNOSIS — S31.109D OPEN WOUND OF ABDOMEN, SUBSEQUENT ENCOUNTER: ICD-10-CM

## 2022-05-03 NOTE — TELEPHONE ENCOUNTER
Zoya called that she would be late and arrived 10 minutes after appointment. Just wants supplies. Gave AqAg and ad foam to dress her wound. Also [employer] number to call and contact for supplies. Rescheduling appointment.

## 2023-12-06 ENCOUNTER — HOSPITAL ENCOUNTER (OUTPATIENT)
Dept: LAB | Facility: MEDICAL CENTER | Age: 33
End: 2023-12-06
Attending: OBSTETRICS & GYNECOLOGY
Payer: COMMERCIAL

## 2023-12-06 LAB
HCV AB SER QL: NORMAL
HIV 1+2 AB+HIV1 P24 AG SERPL QL IA: NORMAL
T PALLIDUM AB SER QL IA: NORMAL

## 2023-12-06 PROCEDURE — 86803 HEPATITIS C AB TEST: CPT

## 2023-12-06 PROCEDURE — 36415 COLL VENOUS BLD VENIPUNCTURE: CPT

## 2023-12-06 PROCEDURE — 87389 HIV-1 AG W/HIV-1&-2 AB AG IA: CPT

## 2023-12-06 PROCEDURE — 86780 TREPONEMA PALLIDUM: CPT

## 2024-01-18 SDOH — ECONOMIC STABILITY: TRANSPORTATION INSECURITY
IN THE PAST 12 MONTHS, HAS THE LACK OF TRANSPORTATION KEPT YOU FROM MEDICAL APPOINTMENTS OR FROM GETTING MEDICATIONS?: NO

## 2024-01-18 SDOH — ECONOMIC STABILITY: HOUSING INSECURITY
IN THE LAST 12 MONTHS, WAS THERE A TIME WHEN YOU DID NOT HAVE A STEADY PLACE TO SLEEP OR SLEPT IN A SHELTER (INCLUDING NOW)?: NO

## 2024-01-18 SDOH — HEALTH STABILITY: PHYSICAL HEALTH: ON AVERAGE, HOW MANY DAYS PER WEEK DO YOU ENGAGE IN MODERATE TO STRENUOUS EXERCISE (LIKE A BRISK WALK)?: 3 DAYS

## 2024-01-18 SDOH — ECONOMIC STABILITY: INCOME INSECURITY: IN THE LAST 12 MONTHS, WAS THERE A TIME WHEN YOU WERE NOT ABLE TO PAY THE MORTGAGE OR RENT ON TIME?: NO

## 2024-01-18 SDOH — ECONOMIC STABILITY: INCOME INSECURITY: HOW HARD IS IT FOR YOU TO PAY FOR THE VERY BASICS LIKE FOOD, HOUSING, MEDICAL CARE, AND HEATING?: NOT VERY HARD

## 2024-01-18 SDOH — ECONOMIC STABILITY: TRANSPORTATION INSECURITY
IN THE PAST 12 MONTHS, HAS LACK OF TRANSPORTATION KEPT YOU FROM MEETINGS, WORK, OR FROM GETTING THINGS NEEDED FOR DAILY LIVING?: NO

## 2024-01-18 SDOH — ECONOMIC STABILITY: FOOD INSECURITY: WITHIN THE PAST 12 MONTHS, YOU WORRIED THAT YOUR FOOD WOULD RUN OUT BEFORE YOU GOT MONEY TO BUY MORE.: NEVER TRUE

## 2024-01-18 SDOH — ECONOMIC STABILITY: FOOD INSECURITY: WITHIN THE PAST 12 MONTHS, THE FOOD YOU BOUGHT JUST DIDN'T LAST AND YOU DIDN'T HAVE MONEY TO GET MORE.: NEVER TRUE

## 2024-01-18 SDOH — HEALTH STABILITY: PHYSICAL HEALTH: ON AVERAGE, HOW MANY MINUTES DO YOU ENGAGE IN EXERCISE AT THIS LEVEL?: 60 MIN

## 2024-01-18 SDOH — HEALTH STABILITY: MENTAL HEALTH
STRESS IS WHEN SOMEONE FEELS TENSE, NERVOUS, ANXIOUS, OR CAN'T SLEEP AT NIGHT BECAUSE THEIR MIND IS TROUBLED. HOW STRESSED ARE YOU?: TO SOME EXTENT

## 2024-01-18 SDOH — ECONOMIC STABILITY: HOUSING INSECURITY: IN THE LAST 12 MONTHS, HOW MANY PLACES HAVE YOU LIVED?: 1

## 2024-01-18 SDOH — ECONOMIC STABILITY: TRANSPORTATION INSECURITY
IN THE PAST 12 MONTHS, HAS LACK OF RELIABLE TRANSPORTATION KEPT YOU FROM MEDICAL APPOINTMENTS, MEETINGS, WORK OR FROM GETTING THINGS NEEDED FOR DAILY LIVING?: NO

## 2024-01-18 ASSESSMENT — SOCIAL DETERMINANTS OF HEALTH (SDOH)
DO YOU BELONG TO ANY CLUBS OR ORGANIZATIONS SUCH AS CHURCH GROUPS UNIONS, FRATERNAL OR ATHLETIC GROUPS, OR SCHOOL GROUPS?: NO
ARE YOU MARRIED, WIDOWED, DIVORCED, SEPARATED, NEVER MARRIED, OR LIVING WITH A PARTNER?: NEVER MARRIED
HOW OFTEN DO YOU ATTEND CHURCH OR RELIGIOUS SERVICES?: NEVER
HOW OFTEN DO YOU ATTEND CHURCH OR RELIGIOUS SERVICES?: NEVER
HOW OFTEN DO YOU GET TOGETHER WITH FRIENDS OR RELATIVES?: TWICE A WEEK
HOW OFTEN DO YOU HAVE SIX OR MORE DRINKS ON ONE OCCASION: LESS THAN MONTHLY
IN A TYPICAL WEEK, HOW MANY TIMES DO YOU TALK ON THE PHONE WITH FAMILY, FRIENDS, OR NEIGHBORS?: THREE TIMES A WEEK
HOW OFTEN DO YOU ATTENT MEETINGS OF THE CLUB OR ORGANIZATION YOU BELONG TO?: NEVER
HOW OFTEN DO YOU HAVE A DRINK CONTAINING ALCOHOL: 2-4 TIMES A MONTH
HOW OFTEN DO YOU GET TOGETHER WITH FRIENDS OR RELATIVES?: TWICE A WEEK
HOW MANY DRINKS CONTAINING ALCOHOL DO YOU HAVE ON A TYPICAL DAY WHEN YOU ARE DRINKING: 1 OR 2
IN A TYPICAL WEEK, HOW MANY TIMES DO YOU TALK ON THE PHONE WITH FAMILY, FRIENDS, OR NEIGHBORS?: THREE TIMES A WEEK
WITHIN THE PAST 12 MONTHS, YOU WORRIED THAT YOUR FOOD WOULD RUN OUT BEFORE YOU GOT THE MONEY TO BUY MORE: NEVER TRUE
ARE YOU MARRIED, WIDOWED, DIVORCED, SEPARATED, NEVER MARRIED, OR LIVING WITH A PARTNER?: NEVER MARRIED
DO YOU BELONG TO ANY CLUBS OR ORGANIZATIONS SUCH AS CHURCH GROUPS UNIONS, FRATERNAL OR ATHLETIC GROUPS, OR SCHOOL GROUPS?: NO
HOW HARD IS IT FOR YOU TO PAY FOR THE VERY BASICS LIKE FOOD, HOUSING, MEDICAL CARE, AND HEATING?: NOT VERY HARD
HOW OFTEN DO YOU ATTENT MEETINGS OF THE CLUB OR ORGANIZATION YOU BELONG TO?: NEVER

## 2024-01-18 ASSESSMENT — LIFESTYLE VARIABLES
HOW OFTEN DO YOU HAVE A DRINK CONTAINING ALCOHOL: 2-4 TIMES A MONTH
HOW MANY STANDARD DRINKS CONTAINING ALCOHOL DO YOU HAVE ON A TYPICAL DAY: 1 OR 2
AUDIT-C TOTAL SCORE: 3
SKIP TO QUESTIONS 9-10: 0
HOW OFTEN DO YOU HAVE SIX OR MORE DRINKS ON ONE OCCASION: LESS THAN MONTHLY

## 2024-01-19 ENCOUNTER — OFFICE VISIT (OUTPATIENT)
Dept: MEDICAL GROUP | Facility: CLINIC | Age: 34
End: 2024-01-19
Payer: COMMERCIAL

## 2024-01-19 ENCOUNTER — HOSPITAL ENCOUNTER (OUTPATIENT)
Dept: LAB | Facility: MEDICAL CENTER | Age: 34
End: 2024-01-19
Payer: COMMERCIAL

## 2024-01-19 VITALS
BODY MASS INDEX: 32.02 KG/M2 | SYSTOLIC BLOOD PRESSURE: 118 MMHG | HEART RATE: 66 BPM | WEIGHT: 174 LBS | TEMPERATURE: 97.6 F | HEIGHT: 62 IN | DIASTOLIC BLOOD PRESSURE: 70 MMHG | OXYGEN SATURATION: 97 % | RESPIRATION RATE: 13 BRPM

## 2024-01-19 DIAGNOSIS — F32.1 CURRENT MODERATE EPISODE OF MAJOR DEPRESSIVE DISORDER WITHOUT PRIOR EPISODE (HCC): ICD-10-CM

## 2024-01-19 DIAGNOSIS — Z13.9 SCREENING DUE: ICD-10-CM

## 2024-01-19 DIAGNOSIS — F41.9 ANXIETY: ICD-10-CM

## 2024-01-19 DIAGNOSIS — F41.1 GENERALIZED ANXIETY DISORDER: ICD-10-CM

## 2024-01-19 LAB
ALBUMIN SERPL BCP-MCNC: 4.8 G/DL (ref 3.2–4.9)
ALBUMIN/GLOB SERPL: 1.7 G/DL
ALP SERPL-CCNC: 75 U/L (ref 30–99)
ALT SERPL-CCNC: 36 U/L (ref 2–50)
ANION GAP SERPL CALC-SCNC: 10 MMOL/L (ref 7–16)
AST SERPL-CCNC: 21 U/L (ref 12–45)
BILIRUB SERPL-MCNC: 0.4 MG/DL (ref 0.1–1.5)
BUN SERPL-MCNC: 10 MG/DL (ref 8–22)
CALCIUM ALBUM COR SERPL-MCNC: 8.6 MG/DL (ref 8.5–10.5)
CALCIUM SERPL-MCNC: 9.2 MG/DL (ref 8.5–10.5)
CHLORIDE SERPL-SCNC: 103 MMOL/L (ref 96–112)
CHOLEST SERPL-MCNC: 168 MG/DL (ref 100–199)
CO2 SERPL-SCNC: 24 MMOL/L (ref 20–33)
CREAT SERPL-MCNC: 0.56 MG/DL (ref 0.5–1.4)
FASTING STATUS PATIENT QL REPORTED: NORMAL
GFR SERPLBLD CREATININE-BSD FMLA CKD-EPI: 123 ML/MIN/1.73 M 2
GLOBULIN SER CALC-MCNC: 2.8 G/DL (ref 1.9–3.5)
GLUCOSE SERPL-MCNC: 99 MG/DL (ref 65–99)
HDLC SERPL-MCNC: 38 MG/DL
LDLC SERPL CALC-MCNC: 116 MG/DL
POTASSIUM SERPL-SCNC: 4.6 MMOL/L (ref 3.6–5.5)
PROT SERPL-MCNC: 7.6 G/DL (ref 6–8.2)
SODIUM SERPL-SCNC: 137 MMOL/L (ref 135–145)
TRIGL SERPL-MCNC: 68 MG/DL (ref 0–149)
TSH SERPL DL<=0.005 MIU/L-ACNC: 1.28 UIU/ML (ref 0.38–5.33)

## 2024-01-19 PROCEDURE — 3078F DIAST BP <80 MM HG: CPT

## 2024-01-19 PROCEDURE — 80061 LIPID PANEL: CPT

## 2024-01-19 PROCEDURE — 99214 OFFICE O/P EST MOD 30 MIN: CPT | Mod: GC

## 2024-01-19 PROCEDURE — 3074F SYST BP LT 130 MM HG: CPT

## 2024-01-19 PROCEDURE — 80053 COMPREHEN METABOLIC PANEL: CPT

## 2024-01-19 PROCEDURE — 84443 ASSAY THYROID STIM HORMONE: CPT

## 2024-01-19 PROCEDURE — 36415 COLL VENOUS BLD VENIPUNCTURE: CPT

## 2024-01-19 RX ORDER — HYDROXYZINE HYDROCHLORIDE 25 MG/1
25 TABLET, FILM COATED ORAL 3 TIMES DAILY PRN
Qty: 30 TABLET | Refills: 0 | Status: SHIPPED | OUTPATIENT
Start: 2024-01-19

## 2024-01-19 ASSESSMENT — ANXIETY QUESTIONNAIRES
5. BEING SO RESTLESS THAT IT IS HARD TO SIT STILL: NEARLY EVERY DAY
2. NOT BEING ABLE TO STOP OR CONTROL WORRYING: NEARLY EVERY DAY
IF YOU CHECKED OFF ANY PROBLEMS ON THIS QUESTIONNAIRE, HOW DIFFICULT HAVE THESE PROBLEMS MADE IT FOR YOU TO DO YOUR WORK, TAKE CARE OF THINGS AT HOME, OR GET ALONG WITH OTHER PEOPLE: VERY DIFFICULT
4. TROUBLE RELAXING: NEARLY EVERY DAY
GAD7 TOTAL SCORE: 20
3. WORRYING TOO MUCH ABOUT DIFFERENT THINGS: NEARLY EVERY DAY
1. FEELING NERVOUS, ANXIOUS, OR ON EDGE: MORE THAN HALF THE DAYS
7. FEELING AFRAID AS IF SOMETHING AWFUL MIGHT HAPPEN: NEARLY EVERY DAY
6. BECOMING EASILY ANNOYED OR IRRITABLE: NEARLY EVERY DAY

## 2024-01-19 ASSESSMENT — PATIENT HEALTH QUESTIONNAIRE - PHQ9
CLINICAL INTERPRETATION OF PHQ2 SCORE: 4
5. POOR APPETITE OR OVEREATING: 1 - SEVERAL DAYS
SUM OF ALL RESPONSES TO PHQ QUESTIONS 1-9: 16

## 2024-01-19 NOTE — PROGRESS NOTES
[facility] MEDICINE     PATIENT ID:  NAME:  Zoya Birmingham  MRN:               [id number]  YOB: 1990    Date: 8:48 AM      Resident: Gab Arredondo M.D.    CC:  anxiety/depression      HPI: Zoya Birmingham is a 33 y.o. female who presented for evaluation of anxiety and depression. Patient notes increased stress due to family concerns and at work. She states that her daughter was recently involved in an altercation with a former friend and there is a pending civil case. She also notes that they are understaffed at work and all of these stressors have made it difficult for her to do her daily obligations.She notes significant anxiety and moderate depression. She notes anxiety is affecting her sleep as she will stay up worrying. She denies any SI or HI. She states she would like to try therapy as it has been working well for her family and does not want to try any daily medications at this time.      No problems updated.        1/19/2024     8:30 AM 6/8/2021     7:40 AM   PHQ-9 Screening   Little interest or pleasure in doing things 2 - more than half the days 0 - not at all   Feeling down, depressed, or hopeless 2 - more than half the days 0 - not at all   Trouble falling or staying asleep, or sleeping too much 2 - more than half the days    Feeling tired or having little energy 1 - several days    Poor appetite or overeating 1 - several days    Feeling bad about yourself - or that you are a failure or have let yourself or your family down 2 - more than half the days    Trouble concentrating on things, such as reading the newspaper or watching television 2 - more than half the days    Moving or speaking so slowly that other people could have noticed. Or the opposite - being so fidgety or restless that you have been moving around a lot more than usual 2 - more than half the days    Thoughts that you would be better off dead, or of hurting yourself in some way 2 - more than half the days    PHQ-2 Total  Score 4 0   PHQ-9 Total Score 16          2024     8:50 AM    RUDY-7 ANXIETY SCALE FLOWSHEET   Feeling nervous, anxious, or on edge 2   Not being able to stop or control worrying 3   Worrying too much about different things 3   Trouble relaxing 3   Being so restless that it is hard to sit still 3   Becoming easily annoyed or irritable 3   Feeling afraid as if something awful might happen 3   RUDY-7 Total Score 20   How difficult have these problems made it for you to do your work, take care of things at home, or get along with other people? Very difficult       Interpretation of RUDY-7 Total Score   Score Severity   0-4 Minimal Anxiety  5-9 Mild Anxiety   10-14 Moderate Anxiety  15-21 Severy Anxiety        Interpretation of PHQ-9 Total Score   Score Severity   1-4 No Depression   5-9 Mild Depression   10-14 Moderate Depression   15-19 Moderately Severe Depression   20-27 Severe Depression      REVIEW OF SYSTEMS:   Ten systems reviewed and were negative except as noted in the HPI.                PROBLEM LIST  Patient Active Problem List   Diagnosis    S/P  section    Preop general physical exam        PAST SURGICAL HISTORY:  Past Surgical History:   Procedure Laterality Date    OTHER ABDOMINAL SURGERY          PRIMARY C SECTION  3/19/2013    Performed by Jim Hoffman M.D. at [facility]    OTHER ABDOMINAL SURGERY          ORIF, FRACTURE, CLAVICLE Left     OTHER ORTHOPEDIC SURGERY      right clavicle fx, 2009    PRIMARY C SECTION         FAMILY HISTORY:  Family History   Problem Relation Age of Onset    Diabetes Maternal Grandmother     No Known Problems Daughter     No Known Problems Son     No Known Problems Son        SOCIAL HISTORY:   Social History     Tobacco Use    Smoking status: Never    Smokeless tobacco: Never   Substance Use Topics    Alcohol use: Yes     Comment: occ       ALLERGIES:  No Known Allergies    OUTPATIENT MEDICATIONS:    Current Outpatient  "Medications:     hydrOXYzine HCl (ATARAX) 25 MG Tab, Take 1 Tablet by mouth 3 times a day as needed for Itching., Disp: 30 Tablet, Rfl: 0    PHYSICAL EXAM:  Vitals:    01/19/24 0833   BP: 118/70   BP Location: Left arm   Patient Position: Sitting   BP Cuff Size: Adult   Pulse: 66   Resp: 13   Temp: 36.4 °C (97.6 °F)   TempSrc: Temporal   SpO2: 97%   Weight: 78.9 kg (174 lb)   Height: 1.575 m (5' 2\")       General: Pt resting in NAD, pleasant and cooperative   Skin:  Pink, warm and dry.  HEENT: NC/AT. EOMI.  Lungs:  Symmetrical.  CTAB, good air movement, no adventitious sounds   Cardiovascular:  S1/S2 RRR, no murmurs rubs or gallops   Abdomen:  Abdomen is soft, nontender  Extremities:  Full range of motion.  CNS:  Muscle tone is normal. No gross focal neurologic deficits      ASSESSMENT/PLAN:   33 y.o. female who presents to clinic to establish with a new provider with concerns of anxiety and depression.     1. Current moderate episode of major depressive disorder without prior episode (HCC)  PHQ-9 score of 16 consistent with moderately severe depression. Will plan to start individual psychotherapy as it has been beneficial for her in a family setting and she does not want to try any daily medication at this time.   - Referral to Psychology    2. Anxiety  RUDY-7 score of 20 consistent with severe anxiety. Will plan to start individual psychotherapy as it has been beneficial for her in a family setting and she does not want to try any daily medication at this time. Will try hydroxyzine for symptomatic control and a hope to improve sleep.   - Referral to Psychology  - hydrOXYzine HCl (ATARAX) 25 MG Tab; Take 1 Tablet by mouth 3 times a day as needed for Itching.  Dispense: 30 Tablet; Refill: 0    3. Generalized anxiety disorder  See above  - Referral to Psychology  - hydrOXYzine HCl (ATARAX) 25 MG Tab; Take 1 Tablet by mouth 3 times a day as needed for Itching.  Dispense: 30 Tablet; Refill: 0    4. Screening " due  Metabolic screening labwork.   - Comp Metabolic Panel; Future  - TSH WITH REFLEX TO FT4; Future  - Lipid Profile; Future      Problem List Items Addressed This Visit    None  Visit Diagnoses       Current moderate episode of major depressive disorder without prior episode (HCC)        Relevant Medications    hydrOXYzine HCl (ATARAX) 25 MG Tab    Other Relevant Orders    Referral to Psychology    Anxiety        Relevant Medications    hydrOXYzine HCl (ATARAX) 25 MG Tab    Other Relevant Orders    Referral to Psychology    Generalized anxiety disorder        Relevant Medications    hydrOXYzine HCl (ATARAX) 25 MG Tab    Other Relevant Orders    Referral to Psychology    Screening due        Relevant Orders    Comp Metabolic Panel (Completed)    TSH WITH REFLEX TO FT4 (Completed)    Lipid Profile (Completed)            Gab Arredondo M.D.  PGY-3  [facility]

## 2024-03-08 ENCOUNTER — OFFICE VISIT (OUTPATIENT)
Dept: MEDICAL GROUP | Facility: CLINIC | Age: 34
End: 2024-03-08
Payer: COMMERCIAL

## 2024-03-08 VITALS
SYSTOLIC BLOOD PRESSURE: 118 MMHG | DIASTOLIC BLOOD PRESSURE: 82 MMHG | BODY MASS INDEX: 32.39 KG/M2 | TEMPERATURE: 98.3 F | WEIGHT: 176 LBS | OXYGEN SATURATION: 97 % | HEIGHT: 62 IN | HEART RATE: 75 BPM

## 2024-03-08 DIAGNOSIS — F32.1 CURRENT MODERATE EPISODE OF MAJOR DEPRESSIVE DISORDER WITHOUT PRIOR EPISODE (HCC): ICD-10-CM

## 2024-03-08 DIAGNOSIS — F41.1 GENERALIZED ANXIETY DISORDER: ICD-10-CM

## 2024-03-08 PROCEDURE — 3079F DIAST BP 80-89 MM HG: CPT | Mod: GC

## 2024-03-08 PROCEDURE — 3074F SYST BP LT 130 MM HG: CPT | Mod: GC

## 2024-03-08 PROCEDURE — 99213 OFFICE O/P EST LOW 20 MIN: CPT | Mod: GE

## 2024-03-08 NOTE — PROGRESS NOTES
[facility]     PATIENT ID:  NAME:  Zoya Birmingham  MRN:               [id number]  YOB: 1990    Date: 3:36 PM      Resident: Gab Arredondo M.D.    CC:  [facility] paperwork      HPI: Zoya Birmingham is a 33 y.o. female who presented with [facility] paperwork she is requesting this filled out.  Patient notes that she would like to [facility] paperwork filled out so that she can begin her referral to psychology for therapy related to her MDD and generalized anxiety and does not have to use personal time off for this.  Patient notes otherwise she is continued family therapy which has been beneficial for her, currently seeing family therapy 2 times monthly.  She denies any current SI or HI.  She states she would like to see how therapy goes before considering escalation in medication management.  Patient notes that she has been using hydroxyzine nightly which has been very beneficial for her sleep and on a few occasions has used it during the day for acute episodes of anxiety.  Otherwise the patient states she has no other questions or concerns.    No problems updated.    REVIEW OF SYSTEMS:   Ten systems reviewed and were negative except as noted in the HPI.                PROBLEM LIST  Patient Active Problem List   Diagnosis    S/P  section    Preop general physical exam        PAST SURGICAL HISTORY:  Past Surgical History:   Procedure Laterality Date    OTHER ABDOMINAL SURGERY          PRIMARY C SECTION  3/19/2013    Performed by Jim Hoffman M.D. at [facility]    OTHER ABDOMINAL SURGERY          ORIF, FRACTURE, CLAVICLE Left     OTHER ORTHOPEDIC SURGERY      right clavicle fx, 2009    PRIMARY C SECTION         FAMILY HISTORY:  Family History   Problem Relation Age of Onset    Diabetes Maternal Grandmother     No Known Problems Daughter     No Known Problems Son     No Known Problems Son        SOCIAL HISTORY:   Social History     Tobacco Use    Smoking status:  "Never    Smokeless tobacco: Never   Substance Use Topics    Alcohol use: Yes     Comment: occ       ALLERGIES:  No Known Allergies    OUTPATIENT MEDICATIONS:    Current Outpatient Medications:     hydrOXYzine HCl (ATARAX) 25 MG Tab, Take 1 Tablet by mouth 3 times a day as needed for Itching., Disp: 30 Tablet, Rfl: 0    PHYSICAL EXAM:  Vitals:    03/08/24 0802   BP: 118/82   BP Location: Right arm   Patient Position: Sitting   BP Cuff Size: Adult   Pulse: 75   Temp: 36.8 °C (98.3 °F)   TempSrc: Temporal   SpO2: 97%   Weight: 79.8 kg (176 lb)   Height: 1.575 m (5' 2\")       General: Pt resting in NAD, pleasant and cooperative   Skin:  Pink, warm and dry.  HEENT: NC/AT. EOMI.  Lungs:  Symmetrical.  CTAB, good air movement, no adventitious sounds  Cardiovascular:  S1/S2 RRR, no murmurs rubs or gallops  Abdomen:  Abdomen is soft, nontender  Extremities:  Full range of motion.  CNS:  Muscle tone is normal. No gross focal neurologic deficits      ASSESSMENT/PLAN:   33 y.o. female who presents to clinic with [place] paperwork to be filled out so that she can attend her planned therapy sessions for management of generalized anxiety disorder and moderate MDD.  Patient notes that her anxiety and depression have been stable with some improvement during family therapy but she is excited about starting personal therapy.  Patient still is not interested in starting SSRI or SNRI at this time but states that she would like to discuss this if she does not have significant improvement with therapy alone.  Patient denies any current SI or HI.  Will plan to follow-up after 1 to 2 months of personal psychotherapy for evaluation of efficacy.    Problem List Items Addressed This Visit    None  Visit Diagnoses       Generalized anxiety disorder        Current moderate episode of major depressive disorder without prior episode (HCC)                Gab Arredondo M.D.  PGY-3  [facility]     "

## 2024-03-18 ENCOUNTER — DOCUMENTATION (OUTPATIENT)
Dept: BEHAVIORAL HEALTH | Facility: CLINIC | Age: 34
End: 2024-03-18
Payer: COMMERCIAL

## 2024-03-18 ENCOUNTER — OFFICE VISIT (OUTPATIENT)
Dept: BEHAVIORAL HEALTH | Facility: CLINIC | Age: 34
End: 2024-03-18
Payer: COMMERCIAL

## 2024-03-18 ENCOUNTER — APPOINTMENT (OUTPATIENT)
Dept: BEHAVIORAL HEALTH | Facility: CLINIC | Age: 34
End: 2024-03-18
Payer: COMMERCIAL

## 2024-03-18 DIAGNOSIS — F43.22 ADJUSTMENT DISORDER WITH ANXIETY: ICD-10-CM

## 2024-03-18 PROCEDURE — 90791 PSYCH DIAGNOSTIC EVALUATION: CPT | Performed by: COUNSELOR
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
## 2024-04-03 ENCOUNTER — OFFICE VISIT (OUTPATIENT)
Dept: BEHAVIORAL HEALTH | Facility: CLINIC | Age: 34
End: 2024-04-03
Payer: COMMERCIAL

## 2024-04-03 DIAGNOSIS — F43.22 ADJUSTMENT DISORDER WITH ANXIETY: ICD-10-CM

## 2024-04-03 PROCEDURE — 90834 PSYTX W PT 45 MINUTES: CPT | Performed by: COUNSELOR

## 2024-04-03 NOTE — PROGRESS NOTES
[facility]   Therapy Progress Note        Name: Zoya Birmingham  MRN: [id number]  : 1990  Age: 33 y.o.  Date of assessment: 4/3/2024  PCP: Gab Arredondo M.D.  Persons in attendance: Patient  Total session time: 50 minutes      Topics addressed in psychotherapy include: Behavior: Patient reports continued feelings of anxiety, stress related to upcoming court case, and family-related stress due to incident from last year. Interventions: Therapist actively listened and reflected back emotions and validated patient's experience. Therapist supported patient in processing heavy emotions from event last year and current stressors. Therapist highlighted patient's strengths back to patient. Response: Patient receptive to above interventions. 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 Patient was able to process some of her emotions related to event last year and shared that she and her  are going to family therapy with their oldest daughter which patient has found helpful. Plan: Patient will return in 2-3 weeks for next therapy session. At next session patient would like to talk more about her relationship/marriage.     Objective Observations:   Participation:Active verbal participation, Attentive, Engaged, and Open to feedback   Grooming:Casual and Neat   Cognition:Alert and Fully Oriented   Eye Contact:Good   Mood:Anxious   Affect:Congruent with content, Sad, Anxious, and Tearful   Thought Process:Logical   Speech:Rate within normal limits and Volume within normal limits    Current Risk:   Suicide: low   Homicide: low   Self-Harm: low   Relapse: low   Safety Plan Reviewed: not applicable    Care Plan Updated: No    Does patient express agreement with the above plan? Yes     Diagnosis:  1. Adjustment disorder with anxiety        Referral appointment(s) scheduled? No       NEEMA Patterson

## 2024-04-18 ENCOUNTER — OFFICE VISIT (OUTPATIENT)
Dept: BEHAVIORAL HEALTH | Facility: CLINIC | Age: 34
End: 2024-04-18
Payer: COMMERCIAL

## 2024-04-18 DIAGNOSIS — F43.22 ADJUSTMENT DISORDER WITH ANXIETY: ICD-10-CM

## 2024-04-18 PROCEDURE — 90834 PSYTX W PT 45 MINUTES: CPT | Performed by: COUNSELOR

## 2024-05-16 ENCOUNTER — OFFICE VISIT (OUTPATIENT)
Dept: BEHAVIORAL HEALTH | Facility: CLINIC | Age: 34
End: 2024-05-16
Payer: COMMERCIAL

## 2024-05-16 DIAGNOSIS — F43.22 ADJUSTMENT DISORDER WITH ANXIETY: ICD-10-CM

## 2024-05-16 PROCEDURE — 90834 PSYTX W PT 45 MINUTES: CPT | Performed by: COUNSELOR

## 2024-05-17 NOTE — PROGRESS NOTES
[facility]   Therapy Progress Note        Name: Zoya Birmingham  MRN: [id number]  : 1990  Age: 34 y.o.  Date of assessment: 2024  PCP: Gab Arredodno M.D.  Persons in attendance: Patient  Total session time: 50 minutes      Topics addressed in psychotherapy include: Behavior: Patient reports that her  did not have to go to long-term/alf and that a lot of her anxiety symptoms have decreased. 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 Patient shares that her mom went missing when she was 3 years old and that has had a lasting impact on her. Interventions: Therapist actively listened and validated patient's experience. Therapist engaged patient in a conversation regarding how her mom missing has impacted her. Therapist explored patient's thoughts and feelings around working on potential grief about her mom in therapy. Response: Patient was receptive to above interventions. Patient acknowledged that no knowing what happened to her mom has been a struggle for her. Patient is willing to continue therapy to process emotions related to mom. Plan: Patient will return in 2-3 weeks for next therapy session.     Objective Observations:   Participation:Active verbal participation, Attentive, Engaged, and Open to feedback   Grooming:Casual and Neat   Cognition:Alert and Fully Oriented   Eye Contact:Good   Mood:Happy   Affect:Congruent with content   Thought Process:Logical   Speech:Rate within normal limits and Volume within normal limits    Current Risk:   Suicide: low   Homicide: low   Self-Harm: low   Relapse: low   Safety Plan Reviewed: not applicable    Care Plan Updated: No    Does patient express agreement with the above plan? Yes     Diagnosis:  1. Adjustment disorder with anxiety        Referral appointment(s) scheduled? No       NEEMA Patterson

## 2024-05-29 ENCOUNTER — APPOINTMENT (OUTPATIENT)
Dept: BEHAVIORAL HEALTH | Facility: CLINIC | Age: 34
End: 2024-05-29
Payer: COMMERCIAL

## 2024-06-10 ENCOUNTER — APPOINTMENT (OUTPATIENT)
Dept: BEHAVIORAL HEALTH | Facility: CLINIC | Age: 34
End: 2024-06-10
Payer: COMMERCIAL

## 2024-06-19 ENCOUNTER — OFFICE VISIT (OUTPATIENT)
Dept: BEHAVIORAL HEALTH | Facility: CLINIC | Age: 34
End: 2024-06-19
Payer: COMMERCIAL

## 2024-06-19 DIAGNOSIS — F43.22 ADJUSTMENT DISORDER WITH ANXIETY: ICD-10-CM

## 2024-06-19 PROCEDURE — 90837 PSYTX W PT 60 MINUTES: CPT | Performed by: COUNSELOR

## 2024-06-19 NOTE — PROGRESS NOTES
[facility]   Therapy Progress Note        Name: Zoya Birmingham  MRN: [id number]  : 1990  Age: 34 y.o.  Date of assessment: 2024  PCP: Gab Arredondo M.D.  Persons in attendance: Patient  Total session time: 55 minutes      Topics addressed in psychotherapy include: Behavior: Patient was tearful towards the end of the session talking about her mom. Patient shares that things are mostly going well in her family. Patient shares some communication struggles with her  and her in-laws' lack of boundaries and how that impacts her marriage. Interventions: Therapist actively listened and reflected back emotions. Therapist explored patient's thoughts and feelings around her mom's disappearance. Therapist supported patient in processing emotions related to her mom's disappearance. Response: Patient was receptive to above interventions. Patient shared that she has felt anger and fear around finding her mom and losing her mom throughout her childhood and into her adulthood. Patient shares that losing her dad and brother shortly after reconnecting with them has caused her to be hesitant in trying to find out more about her mom. Patient was able to process some of the emotions related to her mom. Plan: Patient returns on 2024 for next  therapy session.     Objective Observations:   Participation:Active verbal participation, Attentive, Engaged, and Open to feedback   Grooming:Casual and Neat   Cognition:Alert and Fully Oriented   Eye Contact:Good   Mood:Anxious and Happy   Affect:Congruent with content, Sad, Anxious, and Tearful   Thought Process:Logical and Circumstantial   Speech:Rate within normal limits and Volume within normal limits    Current Risk:   Suicide: low   Homicide: low   Self-Harm: low   Relapse: low   Safety Plan Reviewed: not applicable    Care Plan Updated: No    Does patient express agreement with the above plan? Yes     Diagnosis:  1. Adjustment disorder with anxiety         Referral appointment(s) scheduled? No       RONEY Patterson.

## 2024-07-26 ENCOUNTER — APPOINTMENT (OUTPATIENT)
Dept: BEHAVIORAL HEALTH | Facility: CLINIC | Age: 34
End: 2024-07-26
Payer: COMMERCIAL

## 2024-08-20 ENCOUNTER — OFFICE VISIT (OUTPATIENT)
Dept: BEHAVIORAL HEALTH | Facility: CLINIC | Age: 34
End: 2024-08-20
Payer: COMMERCIAL

## 2024-08-20 DIAGNOSIS — F43.22 ADJUSTMENT DISORDER WITH ANXIETY: ICD-10-CM

## 2024-08-20 PROCEDURE — 90837 PSYTX W PT 60 MINUTES: CPT | Performed by: COUNSELOR

## 2024-08-20 NOTE — PROGRESS NOTES
[facility]   Therapy Progress Note        Name: Zoya Birmingham  MRN: [id number]  : 1990  Age: 34 y.o.  Date of assessment: 2024  PCP: Gab Arredondo M.D.  Persons in attendance: Patient  Total session time: 55 minutes      Topics addressed in psychotherapy include: Behavior: Patient reports doing well overall and reports decreased anxiety. Patient shares that her  has been setting and enforcing healthy boundaries with his family which patient finds helpful. Patient shares that she has also began taking days to do things on her own/with friends.   Interventions: Therapist actively listened and reflected back patient's calmer demeanor. Therapist highlighted strengths in patient's communication with her   and strengths in their marriage. Therapist provided psycho-education on boundaries and relationships and how taking space can help patient be more grounded overall.   Response: Patient was receptive to above interventions. Patient acknowledged that the boundaries have been helpful for her and her family. Patient shares that spending more time with friends has also been helpful for her and her anxiety in pulling back more and letting her  take on some extra responsibilities.   Plan: Patient returns in 1 month for next therapy session.     Objective Observations:   Participation:Active verbal participation, Attentive, Engaged, and Open to feedback   Grooming:Casual and Neat   Cognition:Alert and Fully Oriented   Eye Contact:Good   Mood:Happy   Affect:Congruent with content, Bright, and Happy   Thought Process:Logical and Circumstantial   Speech:Rate within normal limits and Volume within normal limits    Current Risk:   Suicide: low   Homicide: low   Self-Harm: low   Relapse: low   Safety Plan Reviewed: not applicable    Care Plan Updated: No    Does patient express agreement with the above plan?  N/A      Diagnosis:  1. Adjustment disorder with anxiety        Referral  appointment(s) scheduled? No       RONEY Patterson.

## 2025-01-16 ENCOUNTER — RESEARCH ENCOUNTER (OUTPATIENT)
Dept: RESEARCH | Facility: MEDICAL CENTER | Age: 35
End: 2025-01-16

## 2025-01-16 ENCOUNTER — OFFICE VISIT (OUTPATIENT)
Dept: MEDICAL GROUP | Facility: CLINIC | Age: 35
End: 2025-01-16
Payer: COMMERCIAL

## 2025-01-16 VITALS
RESPIRATION RATE: 16 BRPM | HEIGHT: 62 IN | SYSTOLIC BLOOD PRESSURE: 134 MMHG | WEIGHT: 174 LBS | OXYGEN SATURATION: 94 % | DIASTOLIC BLOOD PRESSURE: 89 MMHG | BODY MASS INDEX: 32.02 KG/M2 | HEART RATE: 80 BPM

## 2025-01-16 DIAGNOSIS — Z00.6 RESEARCH STUDY PATIENT: ICD-10-CM

## 2025-01-16 DIAGNOSIS — Z13.79 GENETIC SCREENING: ICD-10-CM

## 2025-01-16 DIAGNOSIS — Z13.228 SCREENING FOR METABOLIC DISORDER: ICD-10-CM

## 2025-01-16 DIAGNOSIS — E78.5 DYSLIPIDEMIA: ICD-10-CM

## 2025-01-16 PROCEDURE — 99385 PREV VISIT NEW AGE 18-39: CPT

## 2025-01-16 PROCEDURE — 3075F SYST BP GE 130 - 139MM HG: CPT

## 2025-01-16 PROCEDURE — 3079F DIAST BP 80-89 MM HG: CPT

## 2025-01-16 ASSESSMENT — PATIENT HEALTH QUESTIONNAIRE - PHQ9: CLINICAL INTERPRETATION OF PHQ2 SCORE: 0

## 2025-01-16 NOTE — PROGRESS NOTES
[facility]  Well Woman Examination    ID: Zoya Birmingham is 34 y.o. y.o.  here for well woman exam and otherwise states she is at baseline state of health.    Subjective     CC:    Chief Complaint   Patient presents with    Annual Exam       HPI:   Zoya presents for annual visit. She states she has been doing well and MDD/Anxiety symptoms have been well controlled with counseling. She reports no other concerns at this time.  She does note that she has an OB/GYN physician and will be following up with them for Pap smear as she believes she is due.  Otherwise she denies any recent fevers, chills, chest pain, shortness of breath, abdominal pain, nausea, vomiting, headache, lightheadedness, dizziness, weakness, lateralizing symptoms, trauma, injury or falls.       ROS:  Gen: denies fevers/chills, denies changes in weight  Pulm: denies shortness of breath, denies cough  CV: denies chest pain, denies palpitations  GI: denies nausea/vomiting, denies diarrhea or constipation  : denies dysuria, denies vaginal discharge or odor  Skin: denies rash    Depression Screening    Little interest or pleasure in doing things?  0 - not at all  Feeling down, depressed , or hopeless? 0 - not at all  Patient Health Questionnaire Score: 0    If depressive symptoms identified deferred to follow up visit unless specifically addressed in assesment and plan.      Interpretation of PHQ-9 Total Score   Score Severity   1-4 Minimal Depression   5-9 Mild Depression   10-14 Moderate Depression   15-19 Moderately Severe Depression   20-27 Severe Depression         History     Patient Active Problem List   Diagnosis    S/P  section    Preop general physical exam        No past medical history on file.     Past Surgical History:   Procedure Laterality Date    OTHER ABDOMINAL SURGERY          PRIMARY C SECTION  3/19/2013    Performed by Jim Hoffman M.D. [facility]    OTHER ABDOMINAL  SURGERY  2013        ORIF, FRACTURE, CLAVICLE Left 2010    OTHER ORTHOPEDIC SURGERY      right clavicle fx, 2009    PRIMARY C SECTION          Current Outpatient Medications   Medication Sig Refill Last Dispense    hydrOXYzine HCl (ATARAX) 25 MG Tab Take 1 Tablet by mouth 3 times a day as needed for Itching. (Patient not taking: Reported on 2025) 0 Unknown (outside pharmacy)        No Known Allergies    Social Drivers of Health     Alcohol Use: Alcohol Misuse (2024)    AUDIT-C     Frequency of Alcohol Consumption: 2-4 times a month     Average Number of Drinks: 1 or 2     Frequency of Binge Drinking: Less than monthly   Depression: Not at risk (2025)    PHQ-2     PHQ-2 Score: 0   Financial Resource Strain: Low Risk  (2024)    Overall Financial Resource Strain (CARDIA)     Difficulty of Paying Living Expenses: Not very hard   Food Insecurity: No Food Insecurity (2024)    Hunger Vital Sign     Worried About Running Out of Food in the Last Year: Never true     Ran Out of Food in the Last Year: Never true   Housing Stability: Low Risk  (2024)    Housing Stability Vital Sign     Unable to Pay for Housing in the Last Year: No     Number of Places Lived in the Last Year: 1     Unstable Housing in the Last Year: No   Intimate Partner Violence: Not on file   Physical Activity: Sufficiently Active (2024)    Exercise Vital Sign     Days of Exercise per Week: 3 days     Minutes of Exercise per Session: 60 min   Social Connections: Socially Isolated (2024)    Social Connection and Isolation Panel [NHANES]     Frequency of Communication with Friends and Family: Three times a week     Frequency of Social Gatherings with Friends and Family: Twice a week     Attends [employer] Services: Never     Active Member of Clubs or Organizations: No     Attends Club or Organization Meetings: Never     Marital Status: Never    Stress: Stress Concern Present (2024)    [facility]  "of Occupational Health - Occupational Stress Questionnaire     Feeling of Stress : To some extent   Tobacco Use: Low Risk  (2024)    Patient History     Smoking Tobacco Use: Never     Smokeless Tobacco Use: Never     Passive Exposure: Not on file   Transportation Needs: No Transportation Needs (2024)    PRAPARE - Transportation     Lack of Transportation (Medical): No     Lack of Transportation (Non-Medical): No   Utilities: Not on file        OB History    Para Term  AB Living   5 4 3 0 0 0   SAB IAB Ectopic Molar Multiple Live Births   0 0 0 0 0 0        Family History   Problem Relation Age of Onset    Diabetes Maternal Grandmother     No Known Problems Daughter     No Known Problems Son     No Known Problems Son         FH7:   Did any first degree relatives have breast or ovarian cancer? No  Did any of your relatives have bilateral breast cancer? No  Did any man in your family have breast cancer? No  Did any woman in your family have breast and ovarian cancer? No  Did any woman in your family have breast cancer before age 50 years? No  Do you have two ore more relatives with breast and/or ovarian cancer? No  Do you have two or more relatives with breast and/or bowel cancer? No    Objective:     /89   Pulse 80   Resp 16   Ht 1.575 m (5' 2\")   Wt 78.9 kg (174 lb)   SpO2 94%   BMI 31.83 kg/m²     General: Pt resting in NAD, pleasant and cooperative   Skin:  Pink, warm and dry.  HEENT: NC/AT. EOMI. PERRLA, No anterior neck masses or lymphadenopathy  Lungs:  Symmetrical.  CTAB, good air movement, no adventitious sounds  Cardiovascular:  S1/S2 RRR, no murmurs rubs or gallops   Abdomen:  Abdomen is soft, nontender, no distention, no abdominal distention, no peritoneal signs  Extremities:  Full range of motion.  CNS:  Muscle tone is normal. No gross focal neurologic deficits      Assessment & Plan:     Zoya Birmingham is 34 y.o.  here for annual appointment.     1. " Dyslipidemia  Will repeat lipid profile at this time. Counseled on statin medications and when to consider them. Will discuss again after labs completed.     2. Screening for metabolic disorder  -CMP, TSH, Lipid profile    3. Genetic screening  Patient is interested in [facility], will provide referral at this time.       #blood pressure screening  - see vital signs above  - 134/89, counseled on BP, will continue to monitor    #anxiety/depression screening  - PHQ2 = 0    #cervical cancer screening  - PAP planned with GYN in 1 month    #breast cancer screening  - mammogram annually after 40 years of age  - FH7 score 0 (1 or more positive response = refer to genetics)     #skin cancer screening  - recommend whole body screening by primary care doctor or dermatology    #osteoporosis screening  - DEXA scan recommneded after 64yo    #substance use screening  - Negative    #lung cancer screening  - if older than 50yr and >20yr/pk history  - please discuss with your primary care doctor    #Immunizations  - annual flu vaccine recommended, patient states she gets this through her work  - COVID vaccines and boosters recommended      Return to clinic annually for well woman exam.    aGb Arredondo MD  [facility]

## 2025-01-16 NOTE — RESEARCH NOTE
Patient has been referred by Gab Arredondo. The initial referral follow-up message, which includes the consent form link, has been sent to the patient.    Eligible Studies: HNP

## 2025-02-26 ENCOUNTER — HOSPITAL ENCOUNTER (OUTPATIENT)
Dept: LAB | Facility: MEDICAL CENTER | Age: 35
End: 2025-02-26
Attending: FAMILY MEDICINE
Payer: COMMERCIAL

## 2025-02-26 ENCOUNTER — HOSPITAL ENCOUNTER (OUTPATIENT)
Dept: LAB | Facility: MEDICAL CENTER | Age: 35
End: 2025-02-26
Payer: COMMERCIAL

## 2025-02-26 DIAGNOSIS — Z13.79 GENETIC SCREENING: ICD-10-CM

## 2025-02-26 DIAGNOSIS — E78.5 DYSLIPIDEMIA: ICD-10-CM

## 2025-02-26 DIAGNOSIS — Z00.6 RESEARCH STUDY PATIENT: ICD-10-CM

## 2025-02-26 DIAGNOSIS — Z13.228 SCREENING FOR METABOLIC DISORDER: ICD-10-CM

## 2025-02-26 LAB
BASOPHILS # BLD AUTO: 0.9 % (ref 0–1.8)
BASOPHILS # BLD: 0.05 K/UL (ref 0–0.12)
EOSINOPHIL # BLD AUTO: 0.23 K/UL (ref 0–0.51)
EOSINOPHIL NFR BLD: 4.3 % (ref 0–6.9)
ERYTHROCYTE [DISTWIDTH] IN BLOOD BY AUTOMATED COUNT: 38.1 FL (ref 35.9–50)
HCT VFR BLD AUTO: 43.1 % (ref 37–47)
HGB BLD-MCNC: 15.1 G/DL (ref 12–16)
IMM GRANULOCYTES # BLD AUTO: 0.01 K/UL (ref 0–0.11)
IMM GRANULOCYTES NFR BLD AUTO: 0.2 % (ref 0–0.9)
LYMPHOCYTES # BLD AUTO: 1.42 K/UL (ref 1–4.8)
LYMPHOCYTES NFR BLD: 26.4 % (ref 22–41)
MCH RBC QN AUTO: 28.9 PG (ref 27–33)
MCHC RBC AUTO-ENTMCNC: 35 G/DL (ref 32.2–35.5)
MCV RBC AUTO: 82.6 FL (ref 81.4–97.8)
MONOCYTES # BLD AUTO: 0.33 K/UL (ref 0–0.85)
MONOCYTES NFR BLD AUTO: 6.1 % (ref 0–13.4)
NEUTROPHILS # BLD AUTO: 3.34 K/UL (ref 1.82–7.42)
NEUTROPHILS NFR BLD: 62.1 % (ref 44–72)
NRBC # BLD AUTO: 0 K/UL
NRBC BLD-RTO: 0 /100 WBC (ref 0–0.2)
PLATELET # BLD AUTO: 305 K/UL (ref 164–446)
PMV BLD AUTO: 10.3 FL (ref 9–12.9)
RBC # BLD AUTO: 5.22 M/UL (ref 4.2–5.4)
WBC # BLD AUTO: 5.4 K/UL (ref 4.8–10.8)

## 2025-02-26 PROCEDURE — 80061 LIPID PANEL: CPT

## 2025-02-26 PROCEDURE — 84443 ASSAY THYROID STIM HORMONE: CPT

## 2025-02-26 PROCEDURE — 85025 COMPLETE CBC W/AUTO DIFF WBC: CPT

## 2025-02-26 PROCEDURE — 36415 COLL VENOUS BLD VENIPUNCTURE: CPT

## 2025-02-26 PROCEDURE — 80053 COMPREHEN METABOLIC PANEL: CPT

## 2025-02-27 LAB
ALBUMIN SERPL BCP-MCNC: 4.6 G/DL (ref 3.2–4.9)
ALBUMIN/GLOB SERPL: 1.6 G/DL
ALP SERPL-CCNC: 73 U/L (ref 30–99)
ALT SERPL-CCNC: 52 U/L (ref 2–50)
ANION GAP SERPL CALC-SCNC: 12 MMOL/L (ref 7–16)
AST SERPL-CCNC: 26 U/L (ref 12–45)
BILIRUB SERPL-MCNC: 0.8 MG/DL (ref 0.1–1.5)
BUN SERPL-MCNC: 11 MG/DL (ref 8–22)
CALCIUM ALBUM COR SERPL-MCNC: 8.7 MG/DL (ref 8.5–10.5)
CALCIUM SERPL-MCNC: 9.2 MG/DL (ref 8.5–10.5)
CHLORIDE SERPL-SCNC: 103 MMOL/L (ref 96–112)
CHOLEST SERPL-MCNC: 161 MG/DL (ref 100–199)
CO2 SERPL-SCNC: 23 MMOL/L (ref 20–33)
CREAT SERPL-MCNC: 0.68 MG/DL (ref 0.5–1.4)
FASTING STATUS PATIENT QL REPORTED: NORMAL
GFR SERPLBLD CREATININE-BSD FMLA CKD-EPI: 116 ML/MIN/1.73 M 2
GLOBULIN SER CALC-MCNC: 2.8 G/DL (ref 1.9–3.5)
GLUCOSE SERPL-MCNC: 92 MG/DL (ref 65–99)
HDLC SERPL-MCNC: 38 MG/DL
LDLC SERPL CALC-MCNC: 107 MG/DL
POTASSIUM SERPL-SCNC: 4.2 MMOL/L (ref 3.6–5.5)
PROT SERPL-MCNC: 7.4 G/DL (ref 6–8.2)
SODIUM SERPL-SCNC: 138 MMOL/L (ref 135–145)
TRIGL SERPL-MCNC: 79 MG/DL (ref 0–149)
TSH SERPL DL<=0.005 MIU/L-ACNC: 2.05 UIU/ML (ref 0.38–5.33)

## 2025-03-08 LAB
APOB+LDLR+PCSK9 GENE MUT ANL BLD/T: NOT DETECTED
BRCA1+BRCA2 DEL+DUP + FULL MUT ANL BLD/T: NOT DETECTED
MLH1+MSH2+MSH6+PMS2 GN DEL+DUP+FUL M: NOT DETECTED

## 2025-03-10 ENCOUNTER — RESULTS FOLLOW-UP (OUTPATIENT)
Dept: MEDICAL GROUP | Facility: PHYSICIAN GROUP | Age: 35
End: 2025-03-10
Payer: COMMERCIAL

## 2025-03-17 ENCOUNTER — APPOINTMENT (OUTPATIENT)
Dept: BEHAVIORAL HEALTH | Facility: CLINIC | Age: 35
End: 2025-03-17
Payer: COMMERCIAL

## 2025-04-03 ENCOUNTER — APPOINTMENT (OUTPATIENT)
Dept: BEHAVIORAL HEALTH | Facility: CLINIC | Age: 35
End: 2025-04-03
Payer: COMMERCIAL

## 2025-04-03 DIAGNOSIS — F43.22 ADJUSTMENT DISORDER WITH ANXIETY: ICD-10-CM

## 2025-04-03 PROCEDURE — 90834 PSYTX W PT 45 MINUTES: CPT | Performed by: COUNSELOR

## 2025-04-03 NOTE — PROGRESS NOTES
[facility]   Therapy Progress Note        Name: Zoya Birmingham  MRN: [id number]  : 1990  Age: 34 y.o.  Date of assessment: 4/3/2025  PCP: Gab Arredondo M.D.  Persons in attendance: Patient  Total session time: 52 minutes      Topics addressed in psychotherapy include: Behavior: Patient reports that she has been experiencing an increase in anxiety and attributes it to some marital issues. Patient shares that she feels overwhelmed with working, taking care of her kids, and managing the house as her  is burnt out from his job and refusing to make any adjustments. Patient shares some communication struggles she is having with her  and expanded on their communication style.   Interventions: Therapist actively listened and reflected back emotions. Therapist validated patient's emotions and experience, and provided psycho-education on boundaries. Therapist supported patient in developing communication skills to use with her  to manage their burn out and stress levels.   Response: Patient was receptive to above interventions, and is motivated to work things through with her  and expresses a desire for couples therapy. Patient acknowledges her 's boundaries with his job are impacting patient and she is motivated to work on setting and enforcing healthier boundaries within her family. Patient is motivated to use communication skills with her , while also recognizing some of the barriers.   Plan: Patient returns for next therapy session on 2025.    Objective Observations:   Participation:Active verbal participation, Attentive, Engaged, and Open to feedback   Grooming:Good, Casual, and Neat   Cognition:Alert and Fully Oriented   Eye Contact:Good   Mood:Anxious   Affect:Congruent with content, Anxious, and Tearful   Thought Process:Logical and Circumstantial   Speech:Rate within normal limits and Volume within normal limits    Current Risk:   Suicide:  low   Homicide: low   Self-Harm: low   Relapse: low   Safety Plan Reviewed: not applicable    Care Plan Updated: No    Does patient express agreement with the above plan? Yes     Diagnosis:  1. Adjustment disorder with anxiety        Referral appointment(s) scheduled? No       RONEY Patterson.

## 2025-06-05 ENCOUNTER — OFFICE VISIT (OUTPATIENT)
Dept: BEHAVIORAL HEALTH | Facility: CLINIC | Age: 35
End: 2025-06-05
Payer: COMMERCIAL

## 2025-06-05 DIAGNOSIS — F43.22 ADJUSTMENT DISORDER WITH ANXIETY: Primary | ICD-10-CM

## 2025-06-05 PROCEDURE — 90837 PSYTX W PT 60 MINUTES: CPT | Performed by: COUNSELOR

## 2025-06-05 NOTE — PROGRESS NOTES
[facility]   Therapy Progress Note        Name: Zoya Birmingham  MRN: [id number]  : 1990  Age: 35 y.o.  Date of assessment: 2025  PCP: Gab Arredondo M.D.  Persons in attendance: Patient  Total session time: 60 minutes      Topics addressed in psychotherapy include: Behavior: Patient reports a significant decrease in anxiety and attributes it to some of the challenges in her marriage getting worked through. Patient shares how she has been communicating with her  and how he has received that feedback; patient acknowledges that she often times has felt dismissed and how her  has been listening to her more and more recently and setting boundaries with his family and his job. Patient expresses relief in her  prioritizing their family and time with their kids more and expands on his family of origin and how her in-laws' boundaries differ from what patient feels would work better for her kids.   Interventions: Therapist actively listened and reflected back strengths in patient's boundaries and marital strengths in order to further decrease overall anxiety. Therapist validated patient's emotions and supported patient in identifying communication skills that patient can continue to use to advocate for her needs. Therapist provided psycho-education on family dynamics and emphasized the importance of boundaries in healthfully navigating challenging family dynamics.   Response: Patient was receptive to above interventions, and expanded on how helpful learning about boundaries has been for patient. Patient was able to identify communication skills that have worked for her and her . Patient expressed understanding of family dynamics and boundaries and expanded on how she has set and enforced boundaries with her in-laws.   Plan: Patient returns for next therapy session on 2025.     Objective Observations:   Participation:Active verbal participation, Attentive, Engaged,  and Open to feedback   Grooming:Good, Casual, and Neat   Cognition:Alert and Fully Oriented   Eye Contact:Good   Mood:Euthymic   Affect:Congruent with content   Thought Process:Circumstantial   Speech:Rate within normal limits and Volume within normal limits    Current Risk:   Suicide: low   Homicide: low   Self-Harm: low   Relapse: low   Safety Plan Reviewed: not applicable    Care Plan Updated: No    Does patient express agreement with the above plan? Yes     Diagnosis:  1. Adjustment disorder with anxiety        Referral appointment(s) scheduled? No       RONEY Patterson.

## 2025-08-19 ENCOUNTER — APPOINTMENT (OUTPATIENT)
Dept: BEHAVIORAL HEALTH | Facility: CLINIC | Age: 35
End: 2025-08-19
Payer: COMMERCIAL